# Patient Record
Sex: MALE | Race: WHITE | NOT HISPANIC OR LATINO | Employment: OTHER | URBAN - METROPOLITAN AREA
[De-identification: names, ages, dates, MRNs, and addresses within clinical notes are randomized per-mention and may not be internally consistent; named-entity substitution may affect disease eponyms.]

---

## 2017-01-12 ENCOUNTER — OFFICE VISIT (OUTPATIENT)
Dept: WOUND CARE | Facility: HOSPITAL | Age: 72
End: 2017-01-12
Attending: SURGERY
Payer: MEDICARE

## 2017-01-12 VITALS — HEART RATE: 50 BPM | RESPIRATION RATE: 20 BRPM | DIASTOLIC BLOOD PRESSURE: 82 MMHG | SYSTOLIC BLOOD PRESSURE: 132 MMHG

## 2017-01-12 DIAGNOSIS — E08.621 DIABETES MELLITUS DUE TO UNDERLYING CONDITION WITH FOOT ULCER, WITHOUT LONG-TERM CURRENT USE OF INSULIN: ICD-10-CM

## 2017-01-12 DIAGNOSIS — L97.522 SKIN ULCER OF TOE OF LEFT FOOT WITH FAT LAYER EXPOSED: Primary | ICD-10-CM

## 2017-01-12 DIAGNOSIS — L97.509 DIABETES MELLITUS DUE TO UNDERLYING CONDITION WITH FOOT ULCER, WITHOUT LONG-TERM CURRENT USE OF INSULIN: ICD-10-CM

## 2017-01-12 PROCEDURE — 11042 DBRDMT SUBQ TIS 1ST 20SQCM/<: CPT

## 2017-01-12 PROCEDURE — 27201912 HC WOUND CARE DEBRIDEMENT SUPPLIES

## 2017-01-26 ENCOUNTER — OFFICE VISIT (OUTPATIENT)
Dept: WOUND CARE | Facility: HOSPITAL | Age: 72
End: 2017-01-26
Attending: SURGERY
Payer: MEDICARE

## 2017-01-26 VITALS — DIASTOLIC BLOOD PRESSURE: 78 MMHG | RESPIRATION RATE: 20 BRPM | SYSTOLIC BLOOD PRESSURE: 130 MMHG | HEART RATE: 53 BPM

## 2017-01-26 DIAGNOSIS — L97.509 DIABETES MELLITUS DUE TO UNDERLYING CONDITION WITH FOOT ULCER, UNSPECIFIED LONG TERM INSULIN USE STATUS: Primary | ICD-10-CM

## 2017-01-26 DIAGNOSIS — E08.621 DIABETES MELLITUS DUE TO UNDERLYING CONDITION WITH FOOT ULCER, UNSPECIFIED LONG TERM INSULIN USE STATUS: Primary | ICD-10-CM

## 2017-01-26 PROCEDURE — 11042 DBRDMT SUBQ TIS 1ST 20SQCM/<: CPT

## 2017-01-26 PROCEDURE — 27201912 HC WOUND CARE DEBRIDEMENT SUPPLIES

## 2017-01-26 NOTE — MR AVS SNAPSHOT
[facility] [address] [place] [address]  Phone: [phone]  Fax: [phone]                  Kane Cool   2017 8:30 AM   Office Visit    Description:  Male : 1945   Provider:  Antonio Hidalgo Jr., MD   Department:  [facility]                To  List           Goals (5 Years of Data)     None      [facility]     [facility] Care [facility] -  Assistance  Registered nurses in the [facility] provide clinical advisement, health education, appointment booking, and other advisory services.  Call for this free service at [phone].             Medications           Message regarding Medications     Verify the changes and/or additions to your medication regime listed below are the same as discussed with your clinician today.  If any of these changes or additions are incorrect, please notify your healthcare provider.             Verify that the below list of medications is an accurate representation of the medications you are currently taking.  If none reported, the list may be blank. If incorrect, please contact your healthcare provider. Carry this list with you in case of emergency.           Current Medications     amiodarone (PACERONE) 200 MG Tab     amlodipine (NORVASC) 5 MG tablet Take 10 mg by mouth once daily.     ascorbic acid, vitamin C, (VITAMIN C) 500 MG tablet Take 500 mg by mouth once daily.    atorvastatin (LIPITOR) 20 MG tablet     doxazosin (CARDURA) 4 MG tablet 4 mg 2 (two) times daily.     glimepiride (AMARYL) 2 MG tablet     hydrochlorothiazide (HYDRODIURIL) 25 MG tablet Take 25 mg by mouth once daily.    metformin (GLUCOPHAGE) 1000 MG tablet 500 mg 2 (two) times daily with meals.     metformin (GLUCOPHAGE) 500 MG tablet Take 500 mg by mouth 3 (three) times daily.     metoprolol succinate (TOPROL-XL) 100 MG 24 hr tablet Take 50 mg by mouth once daily.     vitamin D 1000 units Tab Take 185 mg by  mouth once daily.           Clinical Reference Information           Vital Signs - Last Recorded  Most recent update: 1/26/2017  8:59 AM by Asha Padilla RN    BP Pulse Resp             130/78 (!) 53 20         Blood Pressure          Most Recent Value    BP  130/78      Allergies as of 1/26/2017     Asa [Aspirin]    Neosporin [Benzalkonium Chloride]      Immunizations Administered on Date of Encounter - 1/26/2017     None      [facility] Sign-Up     Activating your [facility] account is as easy as 1-2-3!     1) Visit [email or web address], select Sign Up Now, enter this activation code and your date of birth, then select Next.  [email or web address]  Expires: 3/12/2017  8:59 AM      2) Create a username and password to use when you visit [facility] in the future and select a security question in case you lose your password and select Next.    3) Enter your e-mail address and click Sign Up!    Additional Information  If you have questions, please e-mail [email or web address] or call [phone] to talk to our [facility] staff. Remember, [facility] is NOT to be used for urgent needs. For medical emergencies, dial 911.

## 2017-02-09 ENCOUNTER — OFFICE VISIT (OUTPATIENT)
Dept: WOUND CARE | Facility: HOSPITAL | Age: 72
End: 2017-02-09
Attending: SURGERY
Payer: MEDICARE

## 2017-02-09 VITALS — DIASTOLIC BLOOD PRESSURE: 84 MMHG | HEART RATE: 52 BPM | RESPIRATION RATE: 20 BRPM | SYSTOLIC BLOOD PRESSURE: 150 MMHG

## 2017-02-09 DIAGNOSIS — L97.522 PLANTAR ULCER OF LEFT FOOT, WITH FAT LAYER EXPOSED: ICD-10-CM

## 2017-02-09 DIAGNOSIS — L97.509 DIABETES MELLITUS DUE TO UNDERLYING CONDITION WITH FOOT ULCER, WITHOUT LONG-TERM CURRENT USE OF INSULIN: ICD-10-CM

## 2017-02-09 DIAGNOSIS — L97.509 DIABETES MELLITUS DUE TO UNDERLYING CONDITION WITH FOOT ULCER, UNSPECIFIED LONG TERM INSULIN USE STATUS: Primary | ICD-10-CM

## 2017-02-09 DIAGNOSIS — E08.621 DIABETES MELLITUS DUE TO UNDERLYING CONDITION WITH FOOT ULCER, UNSPECIFIED LONG TERM INSULIN USE STATUS: Primary | ICD-10-CM

## 2017-02-09 DIAGNOSIS — L97.522 SKIN ULCER OF TOE OF LEFT FOOT WITH FAT LAYER EXPOSED: ICD-10-CM

## 2017-02-09 DIAGNOSIS — E08.621 DIABETES MELLITUS DUE TO UNDERLYING CONDITION WITH FOOT ULCER, WITHOUT LONG-TERM CURRENT USE OF INSULIN: ICD-10-CM

## 2017-02-09 PROCEDURE — 11042 DBRDMT SUBQ TIS 1ST 20SQCM/<: CPT

## 2017-02-09 PROCEDURE — 27201912 HC WOUND CARE DEBRIDEMENT SUPPLIES

## 2017-02-09 NOTE — PROGRESS NOTES
The documentation for this encounter was completed in [facility].Please see the scanned in documents for the details.    Elieser Charles MD

## 2017-02-23 ENCOUNTER — OFFICE VISIT (OUTPATIENT)
Dept: WOUND CARE | Facility: HOSPITAL | Age: 72
End: 2017-02-23
Attending: SURGERY
Payer: MEDICARE

## 2017-02-23 VITALS — SYSTOLIC BLOOD PRESSURE: 129 MMHG | HEART RATE: 52 BPM | DIASTOLIC BLOOD PRESSURE: 77 MMHG | RESPIRATION RATE: 20 BRPM

## 2017-02-23 DIAGNOSIS — E08.621 DIABETES MELLITUS DUE TO UNDERLYING CONDITION WITH FOOT ULCER, UNSPECIFIED LONG TERM INSULIN USE STATUS: ICD-10-CM

## 2017-02-23 DIAGNOSIS — L97.522 SKIN ULCER OF TOE OF LEFT FOOT WITH FAT LAYER EXPOSED: Primary | ICD-10-CM

## 2017-02-23 DIAGNOSIS — L97.509 DIABETES MELLITUS DUE TO UNDERLYING CONDITION WITH FOOT ULCER, UNSPECIFIED LONG TERM INSULIN USE STATUS: ICD-10-CM

## 2017-02-23 PROCEDURE — 27201912 HC WOUND CARE DEBRIDEMENT SUPPLIES

## 2017-02-23 PROCEDURE — 11042 DBRDMT SUBQ TIS 1ST 20SQCM/<: CPT

## 2017-03-09 ENCOUNTER — OFFICE VISIT (OUTPATIENT)
Dept: WOUND CARE | Facility: HOSPITAL | Age: 72
End: 2017-03-09
Attending: SURGERY
Payer: MEDICARE

## 2017-03-09 VITALS — HEART RATE: 51 BPM | SYSTOLIC BLOOD PRESSURE: 141 MMHG | DIASTOLIC BLOOD PRESSURE: 77 MMHG | RESPIRATION RATE: 18 BRPM

## 2017-03-09 DIAGNOSIS — L97.509 DIABETES MELLITUS DUE TO UNDERLYING CONDITION WITH FOOT ULCER, UNSPECIFIED LONG TERM INSULIN USE STATUS: Primary | ICD-10-CM

## 2017-03-09 DIAGNOSIS — E08.621 DIABETES MELLITUS DUE TO UNDERLYING CONDITION WITH FOOT ULCER, UNSPECIFIED LONG TERM INSULIN USE STATUS: Primary | ICD-10-CM

## 2017-03-09 DIAGNOSIS — L97.522 SKIN ULCER OF TOE OF LEFT FOOT WITH FAT LAYER EXPOSED: ICD-10-CM

## 2017-03-09 PROCEDURE — 11042 DBRDMT SUBQ TIS 1ST 20SQCM/<: CPT

## 2017-03-23 ENCOUNTER — OFFICE VISIT (OUTPATIENT)
Dept: WOUND CARE | Facility: HOSPITAL | Age: 72
End: 2017-03-23
Attending: SURGERY
Payer: MEDICARE

## 2017-03-23 VITALS — HEART RATE: 71 BPM | DIASTOLIC BLOOD PRESSURE: 80 MMHG | RESPIRATION RATE: 20 BRPM | SYSTOLIC BLOOD PRESSURE: 123 MMHG

## 2017-03-23 DIAGNOSIS — E08.621 DIABETES MELLITUS DUE TO UNDERLYING CONDITION WITH FOOT ULCER, UNSPECIFIED LONG TERM INSULIN USE STATUS: Primary | ICD-10-CM

## 2017-03-23 DIAGNOSIS — L97.509 DIABETES MELLITUS DUE TO UNDERLYING CONDITION WITH FOOT ULCER, UNSPECIFIED LONG TERM INSULIN USE STATUS: Primary | ICD-10-CM

## 2017-03-23 DIAGNOSIS — L97.522 SKIN ULCER OF TOE OF LEFT FOOT WITH FAT LAYER EXPOSED: ICD-10-CM

## 2017-03-23 PROCEDURE — 11042 DBRDMT SUBQ TIS 1ST 20SQCM/<: CPT

## 2017-03-23 PROCEDURE — 27201912 HC WOUND CARE DEBRIDEMENT SUPPLIES

## 2017-04-06 ENCOUNTER — OFFICE VISIT (OUTPATIENT)
Dept: WOUND CARE | Facility: HOSPITAL | Age: 72
End: 2017-04-06
Attending: SURGERY
Payer: MEDICARE

## 2017-04-06 VITALS — RESPIRATION RATE: 20 BRPM | DIASTOLIC BLOOD PRESSURE: 76 MMHG | HEART RATE: 49 BPM | SYSTOLIC BLOOD PRESSURE: 141 MMHG

## 2017-04-06 DIAGNOSIS — E08.621 DIABETES MELLITUS DUE TO UNDERLYING CONDITION WITH FOOT ULCER, UNSPECIFIED LONG TERM INSULIN USE STATUS: Primary | ICD-10-CM

## 2017-04-06 DIAGNOSIS — L97.509 DIABETES MELLITUS DUE TO UNDERLYING CONDITION WITH FOOT ULCER, UNSPECIFIED LONG TERM INSULIN USE STATUS: Primary | ICD-10-CM

## 2017-04-06 DIAGNOSIS — L97.522 SKIN ULCER OF TOE OF LEFT FOOT WITH FAT LAYER EXPOSED: ICD-10-CM

## 2017-04-06 PROCEDURE — 11042 DBRDMT SUBQ TIS 1ST 20SQCM/<: CPT

## 2017-04-06 PROCEDURE — 27201912 HC WOUND CARE DEBRIDEMENT SUPPLIES

## 2017-04-20 ENCOUNTER — OFFICE VISIT (OUTPATIENT)
Dept: WOUND CARE | Facility: HOSPITAL | Age: 72
End: 2017-04-20
Attending: SURGERY
Payer: MEDICARE

## 2017-04-20 VITALS — HEART RATE: 61 BPM | SYSTOLIC BLOOD PRESSURE: 138 MMHG | DIASTOLIC BLOOD PRESSURE: 84 MMHG | RESPIRATION RATE: 20 BRPM

## 2017-04-20 DIAGNOSIS — E08.621 DIABETES MELLITUS DUE TO UNDERLYING CONDITION WITH FOOT ULCER, UNSPECIFIED LONG TERM INSULIN USE STATUS: Primary | ICD-10-CM

## 2017-04-20 DIAGNOSIS — L97.509 DIABETES MELLITUS DUE TO UNDERLYING CONDITION WITH FOOT ULCER, UNSPECIFIED LONG TERM INSULIN USE STATUS: Primary | ICD-10-CM

## 2017-04-20 DIAGNOSIS — L97.522 SKIN ULCER OF TOE OF LEFT FOOT WITH FAT LAYER EXPOSED: ICD-10-CM

## 2017-04-20 PROCEDURE — 11042 DBRDMT SUBQ TIS 1ST 20SQCM/<: CPT

## 2017-04-20 PROCEDURE — 27201912 HC WOUND CARE DEBRIDEMENT SUPPLIES

## 2017-05-04 ENCOUNTER — OFFICE VISIT (OUTPATIENT)
Dept: WOUND CARE | Facility: HOSPITAL | Age: 72
End: 2017-05-04
Attending: SURGERY
Payer: MEDICARE

## 2017-05-04 VITALS — HEART RATE: 50 BPM | DIASTOLIC BLOOD PRESSURE: 76 MMHG | SYSTOLIC BLOOD PRESSURE: 131 MMHG | RESPIRATION RATE: 20 BRPM

## 2017-05-04 DIAGNOSIS — E11.42 TYPE 2 DIABETES MELLITUS WITH PERIPHERAL NEUROPATHY: ICD-10-CM

## 2017-05-04 DIAGNOSIS — L97.522 PLANTAR ULCER OF LEFT FOOT, WITH FAT LAYER EXPOSED: Primary | ICD-10-CM

## 2017-05-04 PROCEDURE — 11042 DBRDMT SUBQ TIS 1ST 20SQCM/<: CPT

## 2017-05-04 PROCEDURE — 27201912 HC WOUND CARE DEBRIDEMENT SUPPLIES

## 2017-05-18 ENCOUNTER — OFFICE VISIT (OUTPATIENT)
Dept: WOUND CARE | Facility: HOSPITAL | Age: 72
End: 2017-05-18
Attending: SURGERY
Payer: MEDICARE

## 2017-05-18 VITALS
RESPIRATION RATE: 18 BRPM | TEMPERATURE: 98 F | HEART RATE: 57 BPM | DIASTOLIC BLOOD PRESSURE: 81 MMHG | SYSTOLIC BLOOD PRESSURE: 127 MMHG

## 2017-05-18 DIAGNOSIS — E11.42 TYPE 2 DIABETES MELLITUS WITH PERIPHERAL NEUROPATHY: ICD-10-CM

## 2017-05-18 DIAGNOSIS — L97.522 SKIN ULCER OF TOE OF LEFT FOOT WITH FAT LAYER EXPOSED: ICD-10-CM

## 2017-05-18 DIAGNOSIS — L97.522 PLANTAR ULCER OF LEFT FOOT, WITH FAT LAYER EXPOSED: Primary | ICD-10-CM

## 2017-05-18 DIAGNOSIS — E08.621 DIABETES MELLITUS DUE TO UNDERLYING CONDITION WITH FOOT ULCER: ICD-10-CM

## 2017-05-18 DIAGNOSIS — L97.509 DIABETES MELLITUS DUE TO UNDERLYING CONDITION WITH FOOT ULCER: ICD-10-CM

## 2017-05-18 PROCEDURE — 11042 DBRDMT SUBQ TIS 1ST 20SQCM/<: CPT

## 2017-05-18 PROCEDURE — 27201912 HC WOUND CARE DEBRIDEMENT SUPPLIES

## 2017-06-01 ENCOUNTER — OFFICE VISIT (OUTPATIENT)
Dept: WOUND CARE | Facility: HOSPITAL | Age: 72
End: 2017-06-01
Attending: SURGERY
Payer: MEDICARE

## 2017-06-01 VITALS
RESPIRATION RATE: 20 BRPM | SYSTOLIC BLOOD PRESSURE: 140 MMHG | HEART RATE: 52 BPM | DIASTOLIC BLOOD PRESSURE: 78 MMHG | TEMPERATURE: 98 F

## 2017-06-01 DIAGNOSIS — E11.42 TYPE 2 DIABETES MELLITUS WITH PERIPHERAL NEUROPATHY: ICD-10-CM

## 2017-06-01 DIAGNOSIS — E08.621 DIABETES MELLITUS DUE TO UNDERLYING CONDITION WITH FOOT ULCER: ICD-10-CM

## 2017-06-01 DIAGNOSIS — L97.522 PLANTAR ULCER OF LEFT FOOT, WITH FAT LAYER EXPOSED: Primary | ICD-10-CM

## 2017-06-01 DIAGNOSIS — L97.522 SKIN ULCER OF TOE OF LEFT FOOT WITH FAT LAYER EXPOSED: ICD-10-CM

## 2017-06-01 DIAGNOSIS — L97.509 DIABETES MELLITUS DUE TO UNDERLYING CONDITION WITH FOOT ULCER: ICD-10-CM

## 2017-06-01 PROCEDURE — 11042 DBRDMT SUBQ TIS 1ST 20SQCM/<: CPT

## 2017-06-01 PROCEDURE — 27201912 HC WOUND CARE DEBRIDEMENT SUPPLIES

## 2017-06-15 ENCOUNTER — OFFICE VISIT (OUTPATIENT)
Dept: WOUND CARE | Facility: HOSPITAL | Age: 72
End: 2017-06-15
Attending: SURGERY
Payer: MEDICARE

## 2017-06-15 VITALS
DIASTOLIC BLOOD PRESSURE: 77 MMHG | TEMPERATURE: 98 F | RESPIRATION RATE: 20 BRPM | HEART RATE: 61 BPM | SYSTOLIC BLOOD PRESSURE: 134 MMHG

## 2017-06-15 DIAGNOSIS — L97.509 DIABETES MELLITUS DUE TO UNDERLYING CONDITION WITH FOOT ULCER, UNSPECIFIED LONG TERM INSULIN USE STATUS: Primary | ICD-10-CM

## 2017-06-15 DIAGNOSIS — L97.522 SKIN ULCER OF TOE OF LEFT FOOT WITH FAT LAYER EXPOSED: ICD-10-CM

## 2017-06-15 DIAGNOSIS — E08.621 DIABETES MELLITUS DUE TO UNDERLYING CONDITION WITH FOOT ULCER, UNSPECIFIED LONG TERM INSULIN USE STATUS: Primary | ICD-10-CM

## 2017-06-15 PROCEDURE — 27201912 HC WOUND CARE DEBRIDEMENT SUPPLIES

## 2017-06-15 PROCEDURE — 11042 DBRDMT SUBQ TIS 1ST 20SQCM/<: CPT

## 2017-06-29 ENCOUNTER — OFFICE VISIT (OUTPATIENT)
Dept: WOUND CARE | Facility: HOSPITAL | Age: 72
End: 2017-06-29
Attending: SURGERY
Payer: MEDICARE

## 2017-06-29 VITALS
SYSTOLIC BLOOD PRESSURE: 137 MMHG | TEMPERATURE: 98 F | HEART RATE: 55 BPM | RESPIRATION RATE: 20 BRPM | DIASTOLIC BLOOD PRESSURE: 83 MMHG

## 2017-06-29 DIAGNOSIS — L97.509 DIABETES MELLITUS DUE TO UNDERLYING CONDITION WITH FOOT ULCER: ICD-10-CM

## 2017-06-29 DIAGNOSIS — E11.42 TYPE 2 DIABETES MELLITUS WITH PERIPHERAL NEUROPATHY: Primary | ICD-10-CM

## 2017-06-29 DIAGNOSIS — E08.621 DIABETES MELLITUS DUE TO UNDERLYING CONDITION WITH FOOT ULCER: ICD-10-CM

## 2017-06-29 DIAGNOSIS — L97.522 ULCER OF LEFT FOOT, WITH FAT LAYER EXPOSED: ICD-10-CM

## 2017-06-29 PROCEDURE — 11042 DBRDMT SUBQ TIS 1ST 20SQCM/<: CPT

## 2017-06-29 PROCEDURE — 27201912 HC WOUND CARE DEBRIDEMENT SUPPLIES

## 2017-07-06 ENCOUNTER — OFFICE VISIT (OUTPATIENT)
Dept: WOUND CARE | Facility: HOSPITAL | Age: 72
End: 2017-07-06
Attending: SURGERY
Payer: MEDICARE

## 2017-07-06 VITALS — DIASTOLIC BLOOD PRESSURE: 72 MMHG | RESPIRATION RATE: 20 BRPM | HEART RATE: 61 BPM | SYSTOLIC BLOOD PRESSURE: 121 MMHG

## 2017-07-06 DIAGNOSIS — L97.522 SKIN ULCER OF LEFT FOOT WITH FAT LAYER EXPOSED: ICD-10-CM

## 2017-07-06 DIAGNOSIS — L97.509 DIABETES MELLITUS DUE TO UNDERLYING CONDITION WITH FOOT ULCER, UNSPECIFIED LONG TERM INSULIN USE STATUS: Primary | ICD-10-CM

## 2017-07-06 DIAGNOSIS — E08.621 DIABETES MELLITUS DUE TO UNDERLYING CONDITION WITH FOOT ULCER, UNSPECIFIED LONG TERM INSULIN USE STATUS: Primary | ICD-10-CM

## 2017-07-06 PROCEDURE — 11042 DBRDMT SUBQ TIS 1ST 20SQCM/<: CPT

## 2017-07-06 PROCEDURE — 27201912 HC WOUND CARE DEBRIDEMENT SUPPLIES

## 2017-07-13 ENCOUNTER — OFFICE VISIT (OUTPATIENT)
Dept: WOUND CARE | Facility: HOSPITAL | Age: 72
End: 2017-07-13
Attending: SURGERY
Payer: MEDICARE

## 2017-07-13 VITALS
HEART RATE: 54 BPM | DIASTOLIC BLOOD PRESSURE: 73 MMHG | TEMPERATURE: 97 F | RESPIRATION RATE: 20 BRPM | SYSTOLIC BLOOD PRESSURE: 131 MMHG

## 2017-07-13 DIAGNOSIS — L97.522 SKIN ULCER OF LEFT FOOT WITH FAT LAYER EXPOSED: Primary | ICD-10-CM

## 2017-07-13 DIAGNOSIS — L97.509 DIABETES MELLITUS DUE TO UNDERLYING CONDITION WITH FOOT ULCER, WITHOUT LONG-TERM CURRENT USE OF INSULIN: ICD-10-CM

## 2017-07-13 DIAGNOSIS — L97.522 PLANTAR ULCER OF LEFT FOOT, WITH FAT LAYER EXPOSED: ICD-10-CM

## 2017-07-13 DIAGNOSIS — L97.522 SKIN ULCER OF TOE OF LEFT FOOT WITH FAT LAYER EXPOSED: ICD-10-CM

## 2017-07-13 DIAGNOSIS — E08.621 DIABETES MELLITUS DUE TO UNDERLYING CONDITION WITH FOOT ULCER, WITHOUT LONG-TERM CURRENT USE OF INSULIN: ICD-10-CM

## 2017-07-13 PROCEDURE — 27201912 HC WOUND CARE DEBRIDEMENT SUPPLIES

## 2017-07-13 PROCEDURE — 11042 DBRDMT SUBQ TIS 1ST 20SQCM/<: CPT

## 2017-07-20 ENCOUNTER — OFFICE VISIT (OUTPATIENT)
Dept: WOUND CARE | Facility: HOSPITAL | Age: 72
End: 2017-07-20
Attending: SURGERY
Payer: MEDICARE

## 2017-07-20 VITALS — RESPIRATION RATE: 20 BRPM | SYSTOLIC BLOOD PRESSURE: 110 MMHG | HEART RATE: 59 BPM | DIASTOLIC BLOOD PRESSURE: 75 MMHG

## 2017-07-20 DIAGNOSIS — E08.621 DIABETES MELLITUS DUE TO UNDERLYING CONDITION WITH FOOT ULCER, UNSPECIFIED LONG TERM INSULIN USE STATUS: Primary | ICD-10-CM

## 2017-07-20 DIAGNOSIS — L97.522 SKIN ULCER OF LEFT FOOT WITH FAT LAYER EXPOSED: ICD-10-CM

## 2017-07-20 DIAGNOSIS — L97.509 DIABETES MELLITUS DUE TO UNDERLYING CONDITION WITH FOOT ULCER, UNSPECIFIED LONG TERM INSULIN USE STATUS: Primary | ICD-10-CM

## 2017-07-20 PROCEDURE — 27201912 HC WOUND CARE DEBRIDEMENT SUPPLIES

## 2017-07-20 PROCEDURE — 11042 DBRDMT SUBQ TIS 1ST 20SQCM/<: CPT

## 2017-07-27 ENCOUNTER — OFFICE VISIT (OUTPATIENT)
Dept: WOUND CARE | Facility: HOSPITAL | Age: 72
End: 2017-07-27
Attending: SURGERY
Payer: MEDICARE

## 2017-07-27 VITALS
DIASTOLIC BLOOD PRESSURE: 77 MMHG | SYSTOLIC BLOOD PRESSURE: 140 MMHG | TEMPERATURE: 98 F | RESPIRATION RATE: 20 BRPM | HEART RATE: 55 BPM

## 2017-07-27 DIAGNOSIS — L97.522 PLANTAR ULCER OF LEFT FOOT, WITH FAT LAYER EXPOSED: ICD-10-CM

## 2017-07-27 DIAGNOSIS — E08.621 DIABETES MELLITUS DUE TO UNDERLYING CONDITION WITH FOOT ULCER, UNSPECIFIED LONG TERM INSULIN USE STATUS: Primary | ICD-10-CM

## 2017-07-27 DIAGNOSIS — L97.509 DIABETES MELLITUS DUE TO UNDERLYING CONDITION WITH FOOT ULCER, UNSPECIFIED LONG TERM INSULIN USE STATUS: Primary | ICD-10-CM

## 2017-07-27 PROCEDURE — 27201912 HC WOUND CARE DEBRIDEMENT SUPPLIES

## 2017-07-27 PROCEDURE — 11042 DBRDMT SUBQ TIS 1ST 20SQCM/<: CPT

## 2017-08-03 ENCOUNTER — OFFICE VISIT (OUTPATIENT)
Dept: WOUND CARE | Facility: HOSPITAL | Age: 72
End: 2017-08-03
Attending: SURGERY
Payer: MEDICARE

## 2017-08-03 VITALS
SYSTOLIC BLOOD PRESSURE: 131 MMHG | DIASTOLIC BLOOD PRESSURE: 79 MMHG | TEMPERATURE: 98 F | RESPIRATION RATE: 20 BRPM | HEART RATE: 64 BPM

## 2017-08-03 DIAGNOSIS — L97.509 DIABETES MELLITUS DUE TO UNDERLYING CONDITION WITH FOOT ULCER, UNSPECIFIED LONG TERM INSULIN USE STATUS: ICD-10-CM

## 2017-08-03 DIAGNOSIS — E08.621 DIABETES MELLITUS DUE TO UNDERLYING CONDITION WITH FOOT ULCER, UNSPECIFIED LONG TERM INSULIN USE STATUS: ICD-10-CM

## 2017-08-03 DIAGNOSIS — L97.522 PLANTAR ULCER OF LEFT FOOT, WITH FAT LAYER EXPOSED: Primary | ICD-10-CM

## 2017-08-03 PROCEDURE — 27201912 HC WOUND CARE DEBRIDEMENT SUPPLIES

## 2017-08-03 PROCEDURE — 11042 DBRDMT SUBQ TIS 1ST 20SQCM/<: CPT

## 2017-08-03 NOTE — PROGRESS NOTES
ROS No new c/o.   Description:  Location: left foot metatarsal head   Duration: Jan. 2016   Context: Had cellulitis and in hospital Jan 2016   Modifying Factors: Diabetes   Pt does lots of gardening and landscaping and doesn't stay off feet enough.

## 2017-08-10 ENCOUNTER — OFFICE VISIT (OUTPATIENT)
Dept: WOUND CARE | Facility: HOSPITAL | Age: 72
End: 2017-08-10
Attending: SURGERY
Payer: MEDICARE

## 2017-08-10 VITALS
SYSTOLIC BLOOD PRESSURE: 101 MMHG | DIASTOLIC BLOOD PRESSURE: 68 MMHG | HEART RATE: 58 BPM | TEMPERATURE: 98 F | RESPIRATION RATE: 20 BRPM

## 2017-08-10 DIAGNOSIS — E08.621 DIABETES MELLITUS DUE TO UNDERLYING CONDITION WITH FOOT ULCER, UNSPECIFIED LONG TERM INSULIN USE STATUS: Primary | ICD-10-CM

## 2017-08-10 DIAGNOSIS — L97.509 DIABETES MELLITUS DUE TO UNDERLYING CONDITION WITH FOOT ULCER, UNSPECIFIED LONG TERM INSULIN USE STATUS: Primary | ICD-10-CM

## 2017-08-10 DIAGNOSIS — L97.522 SKIN ULCER OF LEFT FOOT WITH FAT LAYER EXPOSED: ICD-10-CM

## 2017-08-10 PROCEDURE — 27201912 HC WOUND CARE DEBRIDEMENT SUPPLIES

## 2017-08-10 PROCEDURE — 11042 DBRDMT SUBQ TIS 1ST 20SQCM/<: CPT

## 2017-08-17 ENCOUNTER — OFFICE VISIT (OUTPATIENT)
Dept: WOUND CARE | Facility: HOSPITAL | Age: 72
End: 2017-08-17
Attending: SURGERY
Payer: MEDICARE

## 2017-08-17 VITALS — RESPIRATION RATE: 20 BRPM | HEART RATE: 61 BPM | SYSTOLIC BLOOD PRESSURE: 116 MMHG | DIASTOLIC BLOOD PRESSURE: 73 MMHG

## 2017-08-17 DIAGNOSIS — L97.522 PLANTAR ULCER OF LEFT FOOT, WITH FAT LAYER EXPOSED: Primary | ICD-10-CM

## 2017-08-17 DIAGNOSIS — E11.42 TYPE 2 DIABETES MELLITUS WITH PERIPHERAL NEUROPATHY: ICD-10-CM

## 2017-08-17 PROCEDURE — 11042 DBRDMT SUBQ TIS 1ST 20SQCM/<: CPT

## 2017-08-17 PROCEDURE — 27201912 HC WOUND CARE DEBRIDEMENT SUPPLIES

## 2017-08-24 ENCOUNTER — OFFICE VISIT (OUTPATIENT)
Dept: WOUND CARE | Facility: HOSPITAL | Age: 72
End: 2017-08-24
Attending: SURGERY
Payer: MEDICARE

## 2017-08-24 VITALS — HEART RATE: 71 BPM | RESPIRATION RATE: 18 BRPM | SYSTOLIC BLOOD PRESSURE: 106 MMHG | DIASTOLIC BLOOD PRESSURE: 70 MMHG

## 2017-08-24 DIAGNOSIS — L97.509 DIABETES MELLITUS DUE TO UNDERLYING CONDITION WITH FOOT ULCER, UNSPECIFIED LONG TERM INSULIN USE STATUS: Primary | ICD-10-CM

## 2017-08-24 DIAGNOSIS — E08.621 DIABETES MELLITUS DUE TO UNDERLYING CONDITION WITH FOOT ULCER, UNSPECIFIED LONG TERM INSULIN USE STATUS: Primary | ICD-10-CM

## 2017-08-24 DIAGNOSIS — L97.522 SKIN ULCER OF LEFT FOOT WITH FAT LAYER EXPOSED: ICD-10-CM

## 2017-08-24 PROCEDURE — 11042 DBRDMT SUBQ TIS 1ST 20SQCM/<: CPT

## 2017-08-24 PROCEDURE — 27201912 HC WOUND CARE DEBRIDEMENT SUPPLIES

## 2017-08-31 ENCOUNTER — OFFICE VISIT (OUTPATIENT)
Dept: WOUND CARE | Facility: HOSPITAL | Age: 72
End: 2017-08-31
Attending: SURGERY
Payer: MEDICARE

## 2017-08-31 VITALS
SYSTOLIC BLOOD PRESSURE: 109 MMHG | RESPIRATION RATE: 20 BRPM | HEART RATE: 57 BPM | TEMPERATURE: 98 F | DIASTOLIC BLOOD PRESSURE: 68 MMHG

## 2017-08-31 DIAGNOSIS — L97.522 PLANTAR ULCER OF LEFT FOOT, WITH FAT LAYER EXPOSED: ICD-10-CM

## 2017-08-31 DIAGNOSIS — L97.522 SKIN ULCER OF LEFT FOOT WITH FAT LAYER EXPOSED: Primary | ICD-10-CM

## 2017-08-31 DIAGNOSIS — L97.522 SKIN ULCER OF TOE OF LEFT FOOT WITH FAT LAYER EXPOSED: ICD-10-CM

## 2017-08-31 PROCEDURE — 27201912 HC WOUND CARE DEBRIDEMENT SUPPLIES

## 2017-08-31 PROCEDURE — 11042 DBRDMT SUBQ TIS 1ST 20SQCM/<: CPT

## 2017-09-07 ENCOUNTER — OFFICE VISIT (OUTPATIENT)
Dept: WOUND CARE | Facility: HOSPITAL | Age: 72
End: 2017-09-07
Attending: SURGERY
Payer: MEDICARE

## 2017-09-07 VITALS — SYSTOLIC BLOOD PRESSURE: 136 MMHG | DIASTOLIC BLOOD PRESSURE: 79 MMHG | HEART RATE: 56 BPM | RESPIRATION RATE: 20 BRPM

## 2017-09-07 DIAGNOSIS — E08.621 DIABETES MELLITUS DUE TO UNDERLYING CONDITION WITH FOOT ULCER, UNSPECIFIED LONG TERM INSULIN USE STATUS: Primary | ICD-10-CM

## 2017-09-07 DIAGNOSIS — L97.509 DIABETES MELLITUS DUE TO UNDERLYING CONDITION WITH FOOT ULCER, UNSPECIFIED LONG TERM INSULIN USE STATUS: Primary | ICD-10-CM

## 2017-09-07 PROCEDURE — 11042 DBRDMT SUBQ TIS 1ST 20SQCM/<: CPT

## 2017-09-07 PROCEDURE — 27201912 HC WOUND CARE DEBRIDEMENT SUPPLIES

## 2017-09-14 ENCOUNTER — OFFICE VISIT (OUTPATIENT)
Dept: WOUND CARE | Facility: HOSPITAL | Age: 72
End: 2017-09-14
Attending: SURGERY
Payer: MEDICARE

## 2017-09-14 VITALS
TEMPERATURE: 98 F | HEART RATE: 82 BPM | SYSTOLIC BLOOD PRESSURE: 132 MMHG | DIASTOLIC BLOOD PRESSURE: 86 MMHG | RESPIRATION RATE: 20 BRPM

## 2017-09-14 DIAGNOSIS — E08.621 DIABETES MELLITUS DUE TO UNDERLYING CONDITION WITH FOOT ULCER, UNSPECIFIED LONG TERM INSULIN USE STATUS: Primary | ICD-10-CM

## 2017-09-14 DIAGNOSIS — L97.522 PLANTAR ULCER OF LEFT FOOT, WITH FAT LAYER EXPOSED: ICD-10-CM

## 2017-09-14 DIAGNOSIS — L97.522 SKIN ULCER OF LEFT FOOT WITH FAT LAYER EXPOSED: ICD-10-CM

## 2017-09-14 DIAGNOSIS — L97.509 DIABETES MELLITUS DUE TO UNDERLYING CONDITION WITH FOOT ULCER, UNSPECIFIED LONG TERM INSULIN USE STATUS: Primary | ICD-10-CM

## 2017-09-14 DIAGNOSIS — L97.522 SKIN ULCER OF TOE OF LEFT FOOT WITH FAT LAYER EXPOSED: ICD-10-CM

## 2017-09-14 PROCEDURE — 11042 DBRDMT SUBQ TIS 1ST 20SQCM/<: CPT

## 2017-09-14 PROCEDURE — 27201912 HC WOUND CARE DEBRIDEMENT SUPPLIES

## 2017-09-21 ENCOUNTER — OFFICE VISIT (OUTPATIENT)
Dept: WOUND CARE | Facility: HOSPITAL | Age: 72
End: 2017-09-21
Attending: SURGERY
Payer: MEDICARE

## 2017-09-21 VITALS
RESPIRATION RATE: 20 BRPM | HEART RATE: 53 BPM | SYSTOLIC BLOOD PRESSURE: 126 MMHG | DIASTOLIC BLOOD PRESSURE: 78 MMHG | TEMPERATURE: 97 F

## 2017-09-21 DIAGNOSIS — L97.522 SKIN ULCER OF LEFT FOOT WITH FAT LAYER EXPOSED: ICD-10-CM

## 2017-09-21 DIAGNOSIS — E08.621 DIABETES MELLITUS DUE TO UNDERLYING CONDITION WITH FOOT ULCER, UNSPECIFIED LONG TERM INSULIN USE STATUS: Primary | ICD-10-CM

## 2017-09-21 DIAGNOSIS — L97.509 DIABETES MELLITUS DUE TO UNDERLYING CONDITION WITH FOOT ULCER, UNSPECIFIED LONG TERM INSULIN USE STATUS: Primary | ICD-10-CM

## 2017-09-21 PROCEDURE — 27201912 HC WOUND CARE DEBRIDEMENT SUPPLIES

## 2017-09-21 PROCEDURE — 11042 DBRDMT SUBQ TIS 1ST 20SQCM/<: CPT

## 2017-09-28 ENCOUNTER — OFFICE VISIT (OUTPATIENT)
Dept: WOUND CARE | Facility: HOSPITAL | Age: 72
End: 2017-09-28
Attending: SURGERY
Payer: MEDICARE

## 2017-09-28 VITALS — SYSTOLIC BLOOD PRESSURE: 116 MMHG | HEART RATE: 66 BPM | RESPIRATION RATE: 20 BRPM | DIASTOLIC BLOOD PRESSURE: 73 MMHG

## 2017-09-28 DIAGNOSIS — L97.522 PLANTAR ULCER OF LEFT FOOT, WITH FAT LAYER EXPOSED: ICD-10-CM

## 2017-09-28 DIAGNOSIS — L97.522 SKIN ULCER OF TOE OF LEFT FOOT WITH FAT LAYER EXPOSED: Primary | ICD-10-CM

## 2017-09-28 DIAGNOSIS — E11.42 TYPE 2 DIABETES MELLITUS WITH PERIPHERAL NEUROPATHY: ICD-10-CM

## 2017-09-28 PROCEDURE — 11042 DBRDMT SUBQ TIS 1ST 20SQCM/<: CPT

## 2017-09-28 PROCEDURE — 27201912 HC WOUND CARE DEBRIDEMENT SUPPLIES

## 2017-10-05 ENCOUNTER — OFFICE VISIT (OUTPATIENT)
Dept: WOUND CARE | Facility: HOSPITAL | Age: 72
End: 2017-10-05
Attending: SURGERY
Payer: MEDICARE

## 2017-10-05 VITALS
TEMPERATURE: 98 F | HEART RATE: 55 BPM | DIASTOLIC BLOOD PRESSURE: 78 MMHG | SYSTOLIC BLOOD PRESSURE: 116 MMHG | RESPIRATION RATE: 18 BRPM

## 2017-10-05 DIAGNOSIS — E11.42 TYPE 2 DIABETES MELLITUS WITH PERIPHERAL NEUROPATHY: ICD-10-CM

## 2017-10-05 DIAGNOSIS — L97.509 DIABETES MELLITUS DUE TO UNDERLYING CONDITION WITH FOOT ULCER, UNSPECIFIED LONG TERM INSULIN USE STATUS: ICD-10-CM

## 2017-10-05 DIAGNOSIS — L97.522 SKIN ULCER OF TOE OF LEFT FOOT WITH FAT LAYER EXPOSED: Primary | ICD-10-CM

## 2017-10-05 DIAGNOSIS — E08.621 DIABETES MELLITUS DUE TO UNDERLYING CONDITION WITH FOOT ULCER, UNSPECIFIED LONG TERM INSULIN USE STATUS: ICD-10-CM

## 2017-10-05 DIAGNOSIS — L97.522 PLANTAR ULCER OF LEFT FOOT, WITH FAT LAYER EXPOSED: ICD-10-CM

## 2017-10-05 PROCEDURE — 27201912 HC WOUND CARE DEBRIDEMENT SUPPLIES

## 2017-10-05 PROCEDURE — 11042 DBRDMT SUBQ TIS 1ST 20SQCM/<: CPT

## 2017-10-05 NOTE — PROGRESS NOTES
[facility]  Wound Care  Progress Note    Problem List Items Addressed This Visit     Diabetes mellitus due to underlying condition with foot ulcer    Overview     History of Present Illness      Description:  Description:  Location: left foot metatarsal head   Duration: Jan. 2016   Context: Had cellulitis and in hospital Jan 2016   Modifying Factors: Diabetes   Pt does lots of gardening and landscaping and doesn't stay off feet enough.   Wound left fifth toe healed. Ulcer medial large toe almost healed.          Current Assessment & Plan     Continue with present care.           Other Visit Diagnoses     Skin ulcer of toe of left foot with fat layer exposed    -  Primary    Plantar ulcer of left foot, with fat layer exposed        Type 2 diabetes mellitus with peripheral neuropathy              See wound doc progress notes. Documents will be scanned.        Gonzalez Alvarado  [facility]

## 2017-10-12 ENCOUNTER — OFFICE VISIT (OUTPATIENT)
Dept: WOUND CARE | Facility: HOSPITAL | Age: 72
End: 2017-10-12
Attending: SURGERY
Payer: MEDICARE

## 2017-10-12 VITALS — SYSTOLIC BLOOD PRESSURE: 141 MMHG | HEART RATE: 56 BPM | RESPIRATION RATE: 20 BRPM | DIASTOLIC BLOOD PRESSURE: 89 MMHG

## 2017-10-12 DIAGNOSIS — L97.509 DIABETES MELLITUS DUE TO UNDERLYING CONDITION WITH FOOT ULCER, UNSPECIFIED LONG TERM INSULIN USE STATUS: Primary | ICD-10-CM

## 2017-10-12 DIAGNOSIS — E08.621 DIABETES MELLITUS DUE TO UNDERLYING CONDITION WITH FOOT ULCER, UNSPECIFIED LONG TERM INSULIN USE STATUS: Primary | ICD-10-CM

## 2017-10-12 DIAGNOSIS — L97.522 SKIN ULCER OF LEFT FOOT WITH FAT LAYER EXPOSED: ICD-10-CM

## 2017-10-12 PROCEDURE — 11042 DBRDMT SUBQ TIS 1ST 20SQCM/<: CPT

## 2017-10-12 PROCEDURE — 27201912 HC WOUND CARE DEBRIDEMENT SUPPLIES

## 2017-10-12 NOTE — PROGRESS NOTES
[facility]  Wound Care  Progress Note    Problem List Items Addressed This Visit     Diabetes mellitus due to underlying condition with foot ulcer - Primary    Overview     History of Present Illness      Description:  Description:  Location: left foot metatarsal head   Duration: Jan. 2016   Context: Had cellulitis and in hospital Jan 2016   Modifying Factors: Diabetes   Pt does lots of gardening and landscaping and doesn't stay off feet enough.   Wound left fifth toe healed. Ulcer medial large toe almost healed.          Skin ulcer of left foot with fat layer exposed      Other Visit Diagnoses    None.         See wound doc progress notes. Documents will be scanned.        Antonio Hidalgo Jr  [facility]

## 2017-10-19 ENCOUNTER — OFFICE VISIT (OUTPATIENT)
Dept: WOUND CARE | Facility: HOSPITAL | Age: 72
End: 2017-10-19
Attending: SURGERY
Payer: MEDICARE

## 2017-10-19 VITALS — HEART RATE: 52 BPM | DIASTOLIC BLOOD PRESSURE: 79 MMHG | RESPIRATION RATE: 20 BRPM | SYSTOLIC BLOOD PRESSURE: 130 MMHG

## 2017-10-19 DIAGNOSIS — L97.509 DIABETES MELLITUS DUE TO UNDERLYING CONDITION WITH FOOT ULCER, UNSPECIFIED LONG TERM INSULIN USE STATUS: ICD-10-CM

## 2017-10-19 DIAGNOSIS — E08.621 DIABETES MELLITUS DUE TO UNDERLYING CONDITION WITH FOOT ULCER, UNSPECIFIED LONG TERM INSULIN USE STATUS: ICD-10-CM

## 2017-10-19 PROCEDURE — 27201912 HC WOUND CARE DEBRIDEMENT SUPPLIES

## 2017-10-19 PROCEDURE — 11042 DBRDMT SUBQ TIS 1ST 20SQCM/<: CPT

## 2017-10-19 NOTE — PROGRESS NOTES
[facility]  Wound Care  Progress Note    Problem List Items Addressed This Visit     Diabetes mellitus due to underlying condition with foot ulcer    Overview     History of Present Illness      Description:  Description:  Location: left foot metatarsal head   Duration: Jan. 2016   Context: Had cellulitis and in hospital Jan 2016   Modifying Factors: Diabetes   Pt does lots of gardening and landscaping and doesn't stay off feet enough.   Wound left fifth toe healed. Ulcer medial large toe almost healed.          Current Assessment & Plan     Documentation done on [facility]. See scanned wound care.           Other Visit Diagnoses    None.         See wound doc progress notes. Documents will be scanned.        Gonzalez Alvarado  [facility]

## 2017-11-02 ENCOUNTER — OFFICE VISIT (OUTPATIENT)
Dept: WOUND CARE | Facility: HOSPITAL | Age: 72
End: 2017-11-02
Attending: SURGERY
Payer: MEDICARE

## 2017-11-02 VITALS
TEMPERATURE: 98 F | RESPIRATION RATE: 20 BRPM | HEART RATE: 55 BPM | SYSTOLIC BLOOD PRESSURE: 116 MMHG | DIASTOLIC BLOOD PRESSURE: 66 MMHG

## 2017-11-02 DIAGNOSIS — L97.509 DIABETES MELLITUS DUE TO UNDERLYING CONDITION WITH FOOT ULCER, UNSPECIFIED LONG TERM INSULIN USE STATUS: Primary | ICD-10-CM

## 2017-11-02 DIAGNOSIS — E08.621 DIABETES MELLITUS DUE TO UNDERLYING CONDITION WITH FOOT ULCER, UNSPECIFIED LONG TERM INSULIN USE STATUS: Primary | ICD-10-CM

## 2017-11-02 PROCEDURE — 27201912 HC WOUND CARE DEBRIDEMENT SUPPLIES

## 2017-11-02 PROCEDURE — 11042 DBRDMT SUBQ TIS 1ST 20SQCM/<: CPT

## 2017-11-02 NOTE — PROGRESS NOTES
[facility]  Wound Care  Progress Note    Problem List Items Addressed This Visit     Diabetes mellitus due to underlying condition with foot ulcer - Primary    Overview     History of Present Illness      Description:  Description:  Location: left foot metatarsal head   Duration: Jan. 2016   Context: Had cellulitis and in hospital Jan 2016   Modifying Factors: Diabetes   Pt does lots of gardening and landscaping and doesn't stay off feet enough.   Wound left fifth toe healed. Ulcer medial large toe almost healed.                See wound doc progress notes. Documents will be scanned.        Antonio Hidalgo Jr  [facility]

## 2017-11-16 ENCOUNTER — OFFICE VISIT (OUTPATIENT)
Dept: WOUND CARE | Facility: HOSPITAL | Age: 72
End: 2017-11-16
Attending: SURGERY
Payer: MEDICARE

## 2017-11-16 VITALS — HEART RATE: 61 BPM | DIASTOLIC BLOOD PRESSURE: 82 MMHG | RESPIRATION RATE: 20 BRPM | SYSTOLIC BLOOD PRESSURE: 154 MMHG

## 2017-11-16 DIAGNOSIS — L97.509 DIABETES MELLITUS DUE TO UNDERLYING CONDITION WITH FOOT ULCER, UNSPECIFIED LONG TERM INSULIN USE STATUS: Primary | ICD-10-CM

## 2017-11-16 DIAGNOSIS — E08.621 DIABETES MELLITUS DUE TO UNDERLYING CONDITION WITH FOOT ULCER, UNSPECIFIED LONG TERM INSULIN USE STATUS: Primary | ICD-10-CM

## 2017-11-16 PROCEDURE — 11042 DBRDMT SUBQ TIS 1ST 20SQCM/<: CPT

## 2017-11-16 PROCEDURE — 27201912 HC WOUND CARE DEBRIDEMENT SUPPLIES

## 2017-11-16 NOTE — PROGRESS NOTES
[facility]  Wound Care  Progress Note    Problem List Items Addressed This Visit     Diabetes mellitus due to underlying condition with foot ulcer - Primary    Overview     History of Present Illness      Description:  Description:  Location: left foot metatarsal head   Duration: Jan. 2016   Context: Had cellulitis and in hospital Jan 2016   Modifying Factors: Diabetes   Pt does lots of gardening and landscaping and doesn't stay off feet enough.   Wound left fifth toe healed. Ulcer medial large toe healed.                See wound doc progress notes. Documents will be scanned.        Antonio Hidalgo Jr  [facility]

## 2017-11-30 ENCOUNTER — OFFICE VISIT (OUTPATIENT)
Dept: WOUND CARE | Facility: HOSPITAL | Age: 72
End: 2017-11-30
Attending: SURGERY
Payer: MEDICARE

## 2017-11-30 VITALS
SYSTOLIC BLOOD PRESSURE: 141 MMHG | DIASTOLIC BLOOD PRESSURE: 77 MMHG | RESPIRATION RATE: 20 BRPM | TEMPERATURE: 98 F | HEART RATE: 65 BPM

## 2017-11-30 DIAGNOSIS — E08.621 DIABETES MELLITUS DUE TO UNDERLYING CONDITION WITH FOOT ULCER, UNSPECIFIED LONG TERM INSULIN USE STATUS: Primary | ICD-10-CM

## 2017-11-30 DIAGNOSIS — L97.522 SKIN ULCER OF LEFT FOOT WITH FAT LAYER EXPOSED: ICD-10-CM

## 2017-11-30 DIAGNOSIS — L97.522 NON-HEALING ULCER OF FOOT, LEFT, WITH FAT LAYER EXPOSED: ICD-10-CM

## 2017-11-30 DIAGNOSIS — L97.509 DIABETES MELLITUS DUE TO UNDERLYING CONDITION WITH FOOT ULCER, UNSPECIFIED LONG TERM INSULIN USE STATUS: Primary | ICD-10-CM

## 2017-11-30 PROCEDURE — 11042 DBRDMT SUBQ TIS 1ST 20SQCM/<: CPT

## 2017-11-30 PROCEDURE — 27201912 HC WOUND CARE DEBRIDEMENT SUPPLIES

## 2017-12-14 ENCOUNTER — OFFICE VISIT (OUTPATIENT)
Dept: WOUND CARE | Facility: HOSPITAL | Age: 72
End: 2017-12-14
Attending: SURGERY
Payer: MEDICARE

## 2017-12-14 VITALS — HEART RATE: 57 BPM | DIASTOLIC BLOOD PRESSURE: 79 MMHG | RESPIRATION RATE: 20 BRPM | SYSTOLIC BLOOD PRESSURE: 122 MMHG

## 2017-12-14 DIAGNOSIS — L97.522 ULCER OF LEFT FOOT, WITH FAT LAYER EXPOSED: Primary | ICD-10-CM

## 2017-12-14 PROCEDURE — 11042 DBRDMT SUBQ TIS 1ST 20SQCM/<: CPT

## 2017-12-14 PROCEDURE — 27201912 HC WOUND CARE DEBRIDEMENT SUPPLIES

## 2017-12-28 ENCOUNTER — OFFICE VISIT (OUTPATIENT)
Dept: WOUND CARE | Facility: HOSPITAL | Age: 72
End: 2017-12-28
Attending: SURGERY
Payer: MEDICARE

## 2017-12-28 VITALS
TEMPERATURE: 98 F | SYSTOLIC BLOOD PRESSURE: 138 MMHG | DIASTOLIC BLOOD PRESSURE: 78 MMHG | HEART RATE: 82 BPM | RESPIRATION RATE: 18 BRPM

## 2017-12-28 DIAGNOSIS — L97.522 PLANTAR ULCER OF LEFT FOOT, WITH FAT LAYER EXPOSED: ICD-10-CM

## 2017-12-28 DIAGNOSIS — L97.509 DIABETES MELLITUS DUE TO UNDERLYING CONDITION WITH FOOT ULCER, UNSPECIFIED LONG TERM INSULIN USE STATUS: Primary | ICD-10-CM

## 2017-12-28 DIAGNOSIS — E08.621 DIABETES MELLITUS DUE TO UNDERLYING CONDITION WITH FOOT ULCER, UNSPECIFIED LONG TERM INSULIN USE STATUS: Primary | ICD-10-CM

## 2017-12-28 PROCEDURE — 11042 DBRDMT SUBQ TIS 1ST 20SQCM/<: CPT

## 2017-12-28 PROCEDURE — 27201912 HC WOUND CARE DEBRIDEMENT SUPPLIES

## 2018-01-11 ENCOUNTER — HOSPITAL ENCOUNTER (OUTPATIENT)
Dept: RADIOLOGY | Facility: HOSPITAL | Age: 73
Discharge: HOME | End: 2018-01-11
Attending: SURGERY
Payer: MEDICARE

## 2018-01-11 ENCOUNTER — OFFICE VISIT (OUTPATIENT)
Dept: WOUND CARE | Facility: HOSPITAL | Age: 73
End: 2018-01-11
Attending: SURGERY
Payer: MEDICARE

## 2018-01-11 VITALS
TEMPERATURE: 98 F | DIASTOLIC BLOOD PRESSURE: 69 MMHG | RESPIRATION RATE: 20 BRPM | HEART RATE: 54 BPM | SYSTOLIC BLOOD PRESSURE: 120 MMHG

## 2018-01-11 DIAGNOSIS — E08.621 DIABETES MELLITUS DUE TO UNDERLYING CONDITION WITH FOOT ULCER: ICD-10-CM

## 2018-01-11 DIAGNOSIS — L97.522 PLANTAR ULCER OF LEFT FOOT, WITH FAT LAYER EXPOSED: ICD-10-CM

## 2018-01-11 DIAGNOSIS — E11.621 DIABETIC ULCER OF LEFT GREAT TOE: ICD-10-CM

## 2018-01-11 DIAGNOSIS — L97.509 DIABETES MELLITUS DUE TO UNDERLYING CONDITION WITH FOOT ULCER: ICD-10-CM

## 2018-01-11 DIAGNOSIS — L97.529 DIABETIC ULCER OF LEFT GREAT TOE: ICD-10-CM

## 2018-01-11 DIAGNOSIS — L97.522 SKIN ULCER OF LEFT FOOT WITH FAT LAYER EXPOSED: Primary | ICD-10-CM

## 2018-01-11 PROCEDURE — 73630 X-RAY EXAM OF FOOT: CPT | Mod: TC,LT

## 2018-01-11 PROCEDURE — 73630 X-RAY EXAM OF FOOT: CPT | Mod: 26,LT,, | Performed by: RADIOLOGY

## 2018-01-11 PROCEDURE — 27201912 HC WOUND CARE DEBRIDEMENT SUPPLIES

## 2018-01-11 PROCEDURE — 11042 DBRDMT SUBQ TIS 1ST 20SQCM/<: CPT

## 2018-01-11 RX ORDER — SULFAMETHOXAZOLE AND TRIMETHOPRIM 800; 160 MG/1; MG/1
1 TABLET ORAL 2 TIMES DAILY
COMMUNITY
Start: 2018-01-11 | End: 2018-01-24

## 2018-01-25 ENCOUNTER — OFFICE VISIT (OUTPATIENT)
Dept: WOUND CARE | Facility: HOSPITAL | Age: 73
End: 2018-01-25
Attending: SURGERY
Payer: MEDICARE

## 2018-01-25 VITALS
HEART RATE: 54 BPM | SYSTOLIC BLOOD PRESSURE: 151 MMHG | TEMPERATURE: 98 F | RESPIRATION RATE: 20 BRPM | DIASTOLIC BLOOD PRESSURE: 85 MMHG

## 2018-01-25 DIAGNOSIS — L97.522 PLANTAR ULCER OF LEFT FOOT, WITH FAT LAYER EXPOSED: ICD-10-CM

## 2018-01-25 DIAGNOSIS — L97.509 DIABETES MELLITUS DUE TO UNDERLYING CONDITION WITH FOOT ULCER, UNSPECIFIED LONG TERM INSULIN USE STATUS: ICD-10-CM

## 2018-01-25 DIAGNOSIS — L97.522 SKIN ULCER OF LEFT FOOT WITH FAT LAYER EXPOSED: Primary | ICD-10-CM

## 2018-01-25 DIAGNOSIS — E08.621 DIABETES MELLITUS DUE TO UNDERLYING CONDITION WITH FOOT ULCER, UNSPECIFIED LONG TERM INSULIN USE STATUS: ICD-10-CM

## 2018-01-25 PROCEDURE — 11042 DBRDMT SUBQ TIS 1ST 20SQCM/<: CPT

## 2018-01-25 PROCEDURE — 27201912 HC WOUND CARE DEBRIDEMENT SUPPLIES

## 2018-01-25 NOTE — PROGRESS NOTES
[facility]  Wound Care  Progress Note    Problem List Items Addressed This Visit     Diabetes mellitus due to underlying condition with foot ulcer    Overview     History of Present Illness      Description:  Description:  Location: left foot metatarsal head   Duration: Jan. 2016   Context: Had cellulitis and in hospital Jan 2016   Modifying Factors: Diabetes   Pt does lots of gardening and landscaping and doesn't stay off feet enough. Much improved. Toe appears to be resolved and plantar ulcer is getting smaller with no drainage.                See wound doc progress notes. Documents will be scanned.        Gonzalez Alvarado  [facility]

## 2018-02-08 ENCOUNTER — OFFICE VISIT (OUTPATIENT)
Dept: WOUND CARE | Facility: HOSPITAL | Age: 73
End: 2018-02-08
Attending: SURGERY
Payer: MEDICARE

## 2018-02-08 VITALS
TEMPERATURE: 98 F | SYSTOLIC BLOOD PRESSURE: 127 MMHG | DIASTOLIC BLOOD PRESSURE: 81 MMHG | RESPIRATION RATE: 18 BRPM | HEART RATE: 57 BPM

## 2018-02-08 DIAGNOSIS — L97.509 DIABETES MELLITUS DUE TO UNDERLYING CONDITION WITH FOOT ULCER, UNSPECIFIED LONG TERM INSULIN USE STATUS: Primary | ICD-10-CM

## 2018-02-08 DIAGNOSIS — L97.522 PLANTAR ULCER OF LEFT FOOT, WITH FAT LAYER EXPOSED: ICD-10-CM

## 2018-02-08 DIAGNOSIS — E08.621 DIABETES MELLITUS DUE TO UNDERLYING CONDITION WITH FOOT ULCER, UNSPECIFIED LONG TERM INSULIN USE STATUS: Primary | ICD-10-CM

## 2018-02-08 PROCEDURE — 27201912 HC WOUND CARE DEBRIDEMENT SUPPLIES

## 2018-02-08 PROCEDURE — 11042 DBRDMT SUBQ TIS 1ST 20SQCM/<: CPT

## 2018-02-08 NOTE — ASSESSMENT & PLAN NOTE
Wound on plantar left foot much smaller. On IV antibiotics for left large toe which is now healed.

## 2018-02-08 NOTE — PROGRESS NOTES
[facility]  Wound Care  Progress Note    Problem List Items Addressed This Visit     Diabetes mellitus due to underlying condition with foot ulcer    Overview     History of Present Illness      Description:  Description:  Location: left foot metatarsal head   Duration: Jan. 2016   Context: Had cellulitis and in hospital Jan 2016   Modifying Factors: Diabetes   Pt does lots of gardening and landscaping and doesn't stay off feet enough. Much improved. Toe appears to be resolved and plantar ulcer is getting smaller with no drainage.          Current Assessment & Plan     Wound on plantar left foot much smaller. On IV antibiotics for left large toe which is now healed.                See wound doc progress notes. Documents will be scanned.        Gonzalez Alvarado  [facility]

## 2018-02-22 ENCOUNTER — OFFICE VISIT (OUTPATIENT)
Dept: WOUND CARE | Facility: HOSPITAL | Age: 73
End: 2018-02-22
Attending: SURGERY
Payer: MEDICARE

## 2018-02-22 VITALS
DIASTOLIC BLOOD PRESSURE: 80 MMHG | SYSTOLIC BLOOD PRESSURE: 130 MMHG | TEMPERATURE: 98 F | HEART RATE: 54 BPM | RESPIRATION RATE: 18 BRPM

## 2018-02-22 DIAGNOSIS — L97.509 DIABETES MELLITUS DUE TO UNDERLYING CONDITION WITH FOOT ULCER, UNSPECIFIED LONG TERM INSULIN USE STATUS: ICD-10-CM

## 2018-02-22 DIAGNOSIS — E08.621 DIABETES MELLITUS DUE TO UNDERLYING CONDITION WITH FOOT ULCER, UNSPECIFIED LONG TERM INSULIN USE STATUS: ICD-10-CM

## 2018-02-22 PROCEDURE — 11042 DBRDMT SUBQ TIS 1ST 20SQCM/<: CPT

## 2018-02-22 PROCEDURE — 27201912 HC WOUND CARE DEBRIDEMENT SUPPLIES

## 2018-02-22 NOTE — PROGRESS NOTES
[facility]  Wound Care  Progress Note    Problem List Items Addressed This Visit     Diabetes mellitus due to underlying condition with foot ulcer    Overview     History of Present Illness      Description:Left plantar ulcer  Location: left foot metatarsal head   Duration: Jan. 2016   Context: Had cellulitis and in hospital Jan 2016   Modifying Factors: Diabetes   Were using Abx compound for 2 months.  Will stop and start Mepilex Ag                  See wound doc progress notes. Documents will be scanned.        Antonio Hidalgo Jr  [facility]

## 2018-03-08 ENCOUNTER — OFFICE VISIT (OUTPATIENT)
Dept: WOUND CARE | Facility: HOSPITAL | Age: 73
End: 2018-03-08
Attending: SURGERY
Payer: MEDICARE

## 2018-03-08 VITALS
TEMPERATURE: 97 F | SYSTOLIC BLOOD PRESSURE: 126 MMHG | RESPIRATION RATE: 18 BRPM | DIASTOLIC BLOOD PRESSURE: 79 MMHG | HEART RATE: 60 BPM

## 2018-03-08 DIAGNOSIS — L97.522 PLANTAR ULCER OF LEFT FOOT, WITH FAT LAYER EXPOSED: ICD-10-CM

## 2018-03-08 DIAGNOSIS — L97.509 DIABETES MELLITUS DUE TO UNDERLYING CONDITION WITH FOOT ULCER, UNSPECIFIED LONG TERM INSULIN USE STATUS: Primary | ICD-10-CM

## 2018-03-08 DIAGNOSIS — E08.621 DIABETES MELLITUS DUE TO UNDERLYING CONDITION WITH FOOT ULCER, UNSPECIFIED LONG TERM INSULIN USE STATUS: Primary | ICD-10-CM

## 2018-03-08 PROCEDURE — 27201912 HC WOUND CARE DEBRIDEMENT SUPPLIES

## 2018-03-08 PROCEDURE — 11042 DBRDMT SUBQ TIS 1ST 20SQCM/<: CPT

## 2018-03-08 NOTE — PROGRESS NOTES
[facility]  Wound Care  Progress Note    Problem List Items Addressed This Visit     Diabetes mellitus due to underlying condition with foot ulcer    Overview     History of Present Illness      Description:Left plantar ulcer  Location: left foot metatarsal head   Duration: Jan. 2016   Context: Had cellulitis and in hospital Jan 2016   Modifying Factors: Diabetes   Were using Abx compound for 2 months.  Will stop and start Mepilex Ag            Current Assessment & Plan     Wound seems more shallow. Was changed to Mepelex silver. Continue offloading.               See wound doc progress notes. Documents will be scanned.        Gonzalez Alvarado  [facility]

## 2018-03-22 ENCOUNTER — OFFICE VISIT (OUTPATIENT)
Dept: WOUND CARE | Facility: HOSPITAL | Age: 73
End: 2018-03-22
Attending: SURGERY
Payer: MEDICARE

## 2018-03-22 VITALS
SYSTOLIC BLOOD PRESSURE: 132 MMHG | RESPIRATION RATE: 18 BRPM | DIASTOLIC BLOOD PRESSURE: 84 MMHG | HEART RATE: 87 BPM | TEMPERATURE: 98 F

## 2018-03-22 DIAGNOSIS — L97.522 PLANTAR ULCER OF LEFT FOOT, WITH FAT LAYER EXPOSED: Primary | ICD-10-CM

## 2018-03-22 DIAGNOSIS — L97.509 DIABETES MELLITUS DUE TO UNDERLYING CONDITION WITH FOOT ULCER, UNSPECIFIED LONG TERM INSULIN USE STATUS: ICD-10-CM

## 2018-03-22 DIAGNOSIS — E08.621 DIABETES MELLITUS DUE TO UNDERLYING CONDITION WITH FOOT ULCER, UNSPECIFIED LONG TERM INSULIN USE STATUS: ICD-10-CM

## 2018-03-22 PROCEDURE — 11042 DBRDMT SUBQ TIS 1ST 20SQCM/<: CPT

## 2018-03-22 PROCEDURE — 27201912 HC WOUND CARE DEBRIDEMENT SUPPLIES

## 2018-03-22 NOTE — PROGRESS NOTES
[facility]  Wound Care  Progress Note    Problem List Items Addressed This Visit     Diabetes mellitus due to underlying condition with foot ulcer    Overview     History of Present Illness      Description:Left plantar ulcer  Location: left foot metatarsal head   Duration: Jan. 2016   Context: Had cellulitis and in hospital Jan 2016   Modifying Factors: Diabetes   Were using Abx compound for 2 months.  Will stop and start Mepilex Ag            Current Assessment & Plan     Started Mepilex last week. Wound about same.                See wound doc progress notes. Documents will be scanned.        Gonzalez Alvarado  [facility]

## 2018-04-05 ENCOUNTER — OFFICE VISIT (OUTPATIENT)
Dept: WOUND CARE | Facility: HOSPITAL | Age: 73
End: 2018-04-05
Attending: SURGERY
Payer: MEDICARE

## 2018-04-05 VITALS
SYSTOLIC BLOOD PRESSURE: 131 MMHG | RESPIRATION RATE: 18 BRPM | HEART RATE: 59 BPM | DIASTOLIC BLOOD PRESSURE: 79 MMHG | TEMPERATURE: 98 F

## 2018-04-05 DIAGNOSIS — L97.509 DIABETES MELLITUS DUE TO UNDERLYING CONDITION WITH FOOT ULCER, UNSPECIFIED LONG TERM INSULIN USE STATUS: ICD-10-CM

## 2018-04-05 DIAGNOSIS — L97.522 PLANTAR ULCER OF LEFT FOOT, WITH FAT LAYER EXPOSED: Primary | ICD-10-CM

## 2018-04-05 DIAGNOSIS — E08.621 DIABETES MELLITUS DUE TO UNDERLYING CONDITION WITH FOOT ULCER, UNSPECIFIED LONG TERM INSULIN USE STATUS: ICD-10-CM

## 2018-04-05 PROCEDURE — 27201912 HC WOUND CARE DEBRIDEMENT SUPPLIES

## 2018-04-05 PROCEDURE — 11042 DBRDMT SUBQ TIS 1ST 20SQCM/<: CPT

## 2018-04-05 NOTE — ASSESSMENT & PLAN NOTE
Patient not offloading. Has not gotten shoes as ordered. Wound the same. Continue Mepilex and try to get offloading done otherwise will not improve.

## 2018-04-05 NOTE — PROGRESS NOTES
[facility]  Wound Care  Progress Note    Problem List Items Addressed This Visit     Diabetes mellitus due to underlying condition with foot ulcer    Overview     History of Present Illness      Description:Left plantar ulcer  Location: left foot metatarsal head   Duration: Jan. 2016   Context: Had cellulitis and in hospital Jan 2016   Modifying Factors: Diabetes   Were using Abx compound for 2 months.  Will stop and start Mepilex Ag            Current Assessment & Plan     Patient not offloading. Has not gotten shoes as ordered. Wound the same. Continue Mepilex and try to get offloading done otherwise will not improve.                See wound doc progress notes. Documents will be scanned.        Gonzalez Alvarado  [facility]

## 2018-04-19 ENCOUNTER — OFFICE VISIT (OUTPATIENT)
Dept: WOUND CARE | Facility: HOSPITAL | Age: 73
End: 2018-04-19
Attending: SURGERY
Payer: MEDICARE

## 2018-04-19 VITALS
SYSTOLIC BLOOD PRESSURE: 144 MMHG | TEMPERATURE: 98 F | DIASTOLIC BLOOD PRESSURE: 87 MMHG | RESPIRATION RATE: 18 BRPM | HEART RATE: 87 BPM

## 2018-04-19 DIAGNOSIS — E08.621 DIABETES MELLITUS DUE TO UNDERLYING CONDITION WITH FOOT ULCER, UNSPECIFIED LONG TERM INSULIN USE STATUS: ICD-10-CM

## 2018-04-19 DIAGNOSIS — L97.509 DIABETES MELLITUS DUE TO UNDERLYING CONDITION WITH FOOT ULCER, UNSPECIFIED LONG TERM INSULIN USE STATUS: ICD-10-CM

## 2018-04-19 PROCEDURE — 11042 DBRDMT SUBQ TIS 1ST 20SQCM/<: CPT

## 2018-04-19 PROCEDURE — 27201912 HC WOUND CARE DEBRIDEMENT SUPPLIES

## 2018-04-19 NOTE — PROGRESS NOTES
[facility]  Wound Care  Progress Note    Problem List Items Addressed This Visit     Diabetes mellitus due to underlying condition with foot ulcer    Overview     History of Present Illness      Description:Left plantar ulcer  Location: left foot metatarsal head   Duration: Jan. 2016   Context: Had cellulitis and in hospital Jan 2016   Modifying Factors: Diabetes   On Mepilex Ag  Continues to stall  Pt on his feet                   See wound doc progress notes. Documents will be scanned.        Antonio Hidalgo Jr  [facility]

## 2018-05-17 ENCOUNTER — OFFICE VISIT (OUTPATIENT)
Dept: WOUND CARE | Facility: HOSPITAL | Age: 73
End: 2018-05-17
Attending: SURGERY
Payer: MEDICARE

## 2018-05-17 VITALS
SYSTOLIC BLOOD PRESSURE: 158 MMHG | RESPIRATION RATE: 18 BRPM | TEMPERATURE: 98 F | DIASTOLIC BLOOD PRESSURE: 97 MMHG | HEART RATE: 65 BPM

## 2018-05-17 DIAGNOSIS — E08.621 DIABETES MELLITUS DUE TO UNDERLYING CONDITION WITH FOOT ULCER, UNSPECIFIED WHETHER LONG TERM INSULIN USE: Primary | ICD-10-CM

## 2018-05-17 DIAGNOSIS — L97.509 DIABETES MELLITUS DUE TO UNDERLYING CONDITION WITH FOOT ULCER, UNSPECIFIED WHETHER LONG TERM INSULIN USE: Primary | ICD-10-CM

## 2018-05-17 PROCEDURE — 97597 DBRDMT OPN WND 1ST 20 CM/<: CPT

## 2018-05-17 RX ORDER — PANTOPRAZOLE SODIUM 40 MG/1
40 TABLET, DELAYED RELEASE ORAL DAILY
COMMUNITY
End: 2022-06-23 | Stop reason: ALTCHOICE

## 2018-05-17 RX ORDER — CLOPIDOGREL BISULFATE 75 MG/1
75 TABLET ORAL DAILY
COMMUNITY

## 2018-05-17 RX ORDER — OXYCODONE AND ACETAMINOPHEN 5; 325 MG/1; MG/1
1 TABLET ORAL EVERY 4 HOURS PRN
COMMUNITY
End: 2019-02-14

## 2018-05-17 RX ORDER — TAMSULOSIN HYDROCHLORIDE 0.4 MG/1
0.4 CAPSULE ORAL DAILY
COMMUNITY

## 2018-05-17 NOTE — PROGRESS NOTES
[facility]  Wound Care  Progress Note    Problem List Items Addressed This Visit     Diabetes mellitus due to underlying condition with foot ulcer - Primary    Overview     History of Present Illness      Description:Left plantar ulcer  Location: left foot metatarsal head   Duration: Jan. 2016   Context: Had cellulitis and in hospital Jan 2016   Modifying Factors: Diabetes   On Mepilex Ag  Continues to stall  Pt on his feet                   See wound doc progress notes. Documents will be scanned.        Antonio Hidalgo Jr  [facility]

## 2018-05-31 ENCOUNTER — OFFICE VISIT (OUTPATIENT)
Dept: WOUND CARE | Facility: HOSPITAL | Age: 73
End: 2018-05-31
Attending: SURGERY
Payer: MEDICARE

## 2018-05-31 VITALS — HEART RATE: 64 BPM | SYSTOLIC BLOOD PRESSURE: 168 MMHG | DIASTOLIC BLOOD PRESSURE: 105 MMHG | RESPIRATION RATE: 20 BRPM

## 2018-05-31 DIAGNOSIS — L97.509 DIABETES MELLITUS DUE TO UNDERLYING CONDITION WITH FOOT ULCER, UNSPECIFIED WHETHER LONG TERM INSULIN USE: Primary | ICD-10-CM

## 2018-05-31 DIAGNOSIS — E08.621 DIABETES MELLITUS DUE TO UNDERLYING CONDITION WITH FOOT ULCER, UNSPECIFIED WHETHER LONG TERM INSULIN USE: Primary | ICD-10-CM

## 2018-05-31 PROCEDURE — 27201912 HC WOUND CARE DEBRIDEMENT SUPPLIES

## 2018-05-31 PROCEDURE — 11042 DBRDMT SUBQ TIS 1ST 20SQCM/<: CPT

## 2018-06-14 ENCOUNTER — OFFICE VISIT (OUTPATIENT)
Dept: WOUND CARE | Facility: HOSPITAL | Age: 73
End: 2018-06-14
Attending: SURGERY
Payer: MEDICARE

## 2018-06-14 VITALS
DIASTOLIC BLOOD PRESSURE: 96 MMHG | HEART RATE: 70 BPM | RESPIRATION RATE: 18 BRPM | TEMPERATURE: 98 F | SYSTOLIC BLOOD PRESSURE: 172 MMHG

## 2018-06-14 DIAGNOSIS — E08.621 DIABETES MELLITUS DUE TO UNDERLYING CONDITION WITH FOOT ULCER, UNSPECIFIED WHETHER LONG TERM INSULIN USE: ICD-10-CM

## 2018-06-14 DIAGNOSIS — L97.509 DIABETES MELLITUS DUE TO UNDERLYING CONDITION WITH FOOT ULCER, UNSPECIFIED WHETHER LONG TERM INSULIN USE: ICD-10-CM

## 2018-06-14 PROCEDURE — 97597 DBRDMT OPN WND 1ST 20 CM/<: CPT

## 2018-06-14 RX ORDER — VALSARTAN 40 MG/1
40 TABLET ORAL DAILY
COMMUNITY
End: 2019-02-14

## 2018-06-14 NOTE — PROGRESS NOTES
[facility]  Wound Care  Progress Note    Problem List Items Addressed This Visit     Diabetes mellitus due to underlying condition with foot ulcer    Overview     History of Present Illness      Description:Left plantar ulcer  Location: left foot metatarsal head   Duration: Jan. 2016   Context: Had cellulitis and in hospital Jan 2016   Modifying Factors: Diabetes   On Mepilex Ag  Best it has looked b/c he is staying off his feet.                  See wound doc progress notes. Documents will be scanned.        Antonio Hidalgo Jr  [facility]

## 2018-06-28 ENCOUNTER — OFFICE VISIT (OUTPATIENT)
Dept: WOUND CARE | Facility: HOSPITAL | Age: 73
End: 2018-06-28
Attending: SURGERY
Payer: MEDICARE

## 2018-06-28 DIAGNOSIS — L97.509 DIABETES MELLITUS DUE TO UNDERLYING CONDITION WITH FOOT ULCER, UNSPECIFIED WHETHER LONG TERM INSULIN USE: Primary | ICD-10-CM

## 2018-06-28 DIAGNOSIS — E08.621 DIABETES MELLITUS DUE TO UNDERLYING CONDITION WITH FOOT ULCER, UNSPECIFIED WHETHER LONG TERM INSULIN USE: Primary | ICD-10-CM

## 2018-06-28 PROCEDURE — 27201912 HC WOUND CARE DEBRIDEMENT SUPPLIES

## 2018-06-28 PROCEDURE — 11042 DBRDMT SUBQ TIS 1ST 20SQCM/<: CPT

## 2018-06-28 NOTE — PROGRESS NOTES
[facility]  Wound Care  Progress Note    Problem List Items Addressed This Visit     Diabetes mellitus due to underlying condition with foot ulcer - Primary    Overview     History of Present Illness      Description:Left plantar ulcer  Location: left foot metatarsal head   Duration: Jan. 2016   Context: Had cellulitis and in hospital Jan 2016   Modifying Factors: Diabetes   On Mepilex Ag  Best it has looked b/c he is staying off his feet.            Current Assessment & Plan     Wound continues to get smaller. Continue to offload.                See wound doc progress notes. Documents will be scanned.        Gonzalez Alvarado  [facility]

## 2018-07-12 ENCOUNTER — OFFICE VISIT (OUTPATIENT)
Dept: WOUND CARE | Facility: HOSPITAL | Age: 73
End: 2018-07-12
Attending: SURGERY
Payer: MEDICARE

## 2018-07-12 VITALS
HEART RATE: 74 BPM | SYSTOLIC BLOOD PRESSURE: 126 MMHG | DIASTOLIC BLOOD PRESSURE: 72 MMHG | TEMPERATURE: 98 F | RESPIRATION RATE: 18 BRPM

## 2018-07-12 DIAGNOSIS — L97.509 DIABETES MELLITUS DUE TO UNDERLYING CONDITION WITH FOOT ULCER, UNSPECIFIED WHETHER LONG TERM INSULIN USE: ICD-10-CM

## 2018-07-12 DIAGNOSIS — E08.621 DIABETES MELLITUS DUE TO UNDERLYING CONDITION WITH FOOT ULCER, UNSPECIFIED WHETHER LONG TERM INSULIN USE: ICD-10-CM

## 2018-07-12 PROCEDURE — 27201912 HC WOUND CARE DEBRIDEMENT SUPPLIES

## 2018-07-12 PROCEDURE — 99499 UNLISTED E&M SERVICE: CPT | Mod: ,,, | Performed by: SURGERY

## 2018-07-12 PROCEDURE — 11042 DBRDMT SUBQ TIS 1ST 20SQCM/<: CPT

## 2018-07-12 RX ORDER — METOPROLOL SUCCINATE 25 MG/1
25 TABLET, EXTENDED RELEASE ORAL DAILY
COMMUNITY
End: 2019-02-14

## 2018-07-12 RX ORDER — ISOSORBIDE DINITRATE AND HYDRALAZINE HYDROCHLORIDE 37.5; 2 MG/1; MG/1
1 TABLET ORAL 3 TIMES DAILY
COMMUNITY
End: 2022-06-23 | Stop reason: CLARIF

## 2018-07-12 NOTE — PROGRESS NOTES
[facility]  Wound Care  Progress Note    Problem List Items Addressed This Visit     Diabetes mellitus due to underlying condition with foot ulcer    Overview     History of Present Illness      Description:Left plantar ulcer  Location: left foot metatarsal head   Duration: Jan. 2016   Context: Had cellulitis and in hospital Jan 2016   Modifying Factors: Diabetes   On Mepilex Ag  Looking good he is staying off his feet.  Continue Mepilex Ag.                  See wound doc progress notes. Documents will be scanned.        Antonio Hidalgo Jr  [facility]

## 2018-07-26 ENCOUNTER — OFFICE VISIT (OUTPATIENT)
Dept: WOUND CARE | Facility: HOSPITAL | Age: 73
End: 2018-07-26
Attending: SURGERY
Payer: MEDICARE

## 2018-07-26 VITALS
DIASTOLIC BLOOD PRESSURE: 68 MMHG | SYSTOLIC BLOOD PRESSURE: 106 MMHG | TEMPERATURE: 98 F | RESPIRATION RATE: 18 BRPM | HEART RATE: 66 BPM

## 2018-07-26 DIAGNOSIS — L97.522 PLANTAR ULCER OF LEFT FOOT, WITH FAT LAYER EXPOSED: Primary | ICD-10-CM

## 2018-07-26 DIAGNOSIS — E08.621 DIABETES MELLITUS DUE TO UNDERLYING CONDITION WITH FOOT ULCER, UNSPECIFIED WHETHER LONG TERM INSULIN USE: ICD-10-CM

## 2018-07-26 DIAGNOSIS — L97.509 DIABETES MELLITUS DUE TO UNDERLYING CONDITION WITH FOOT ULCER, UNSPECIFIED WHETHER LONG TERM INSULIN USE: ICD-10-CM

## 2018-07-26 PROCEDURE — 11042 DBRDMT SUBQ TIS 1ST 20SQCM/<: CPT

## 2018-07-26 PROCEDURE — 27201912 HC WOUND CARE DEBRIDEMENT SUPPLIES

## 2018-07-26 PROCEDURE — 99499 UNLISTED E&M SERVICE: CPT | Mod: ,,, | Performed by: SURGERY

## 2018-07-26 NOTE — ASSESSMENT & PLAN NOTE
Wound is much smaller. No callous this time. Debridement performed. Will need continued debridement. continue  Present plan as above.

## 2018-07-26 NOTE — PROGRESS NOTES
[facility]  Wound Care  Progress Note    Problem List Items Addressed This Visit     Diabetes mellitus due to underlying condition with foot ulcer    Overview     History of Present Illness      Description:Left plantar ulcer  Location: left foot metatarsal head   Duration: Jan. 2016   Context: Had cellulitis and in hospital Jan 2016   Modifying Factors: Diabetes   On Mepilex Ag  Looking good he is staying off his feet.  Continue Mepilex Ag.            Current Assessment & Plan     Wound is much smaller. No callous this time. Debridement performed. Will need continued debridement. continue  Present plan as above.            Other Visit Diagnoses     Plantar ulcer of left foot, with fat layer exposed    -  Primary          See wound doc progress notes. Documents will be scanned.        Gonzalez Alvarado  [facility]

## 2018-08-09 ENCOUNTER — OFFICE VISIT (OUTPATIENT)
Dept: WOUND CARE | Facility: HOSPITAL | Age: 73
End: 2018-08-09
Attending: SURGERY
Payer: MEDICARE

## 2018-08-09 VITALS
RESPIRATION RATE: 18 BRPM | HEART RATE: 88 BPM | TEMPERATURE: 98 F | SYSTOLIC BLOOD PRESSURE: 132 MMHG | DIASTOLIC BLOOD PRESSURE: 87 MMHG

## 2018-08-09 DIAGNOSIS — L97.509 DIABETES MELLITUS DUE TO UNDERLYING CONDITION WITH FOOT ULCER, UNSPECIFIED WHETHER LONG TERM INSULIN USE: ICD-10-CM

## 2018-08-09 DIAGNOSIS — E08.621 DIABETES MELLITUS DUE TO UNDERLYING CONDITION WITH FOOT ULCER, UNSPECIFIED WHETHER LONG TERM INSULIN USE: ICD-10-CM

## 2018-08-09 PROCEDURE — 99499 UNLISTED E&M SERVICE: CPT | Mod: ,,, | Performed by: SURGERY

## 2018-08-09 PROCEDURE — 99211 OFF/OP EST MAY X REQ PHY/QHP: CPT

## 2018-08-09 NOTE — PROGRESS NOTES
[facility]  Wound Care  Progress Note    Problem List Items Addressed This Visit     Diabetes mellitus due to underlying condition with foot ulcer    Overview     History of Present Illness      Description:Left plantar ulcer  Location: left foot metatarsal head   Duration: Jan. 2016   Context: Had cellulitis and in hospital Jan 2016   Modifying Factors: Diabetes   On Mepilex Ag  Looking good he is staying off his feet.  Continue Mepilex Ag.  Did not need to debride today.  The wound is nearly closed.                   See wound doc progress notes. Documents will be scanned.        Antonio Hidalgo Jr  [facility]

## 2018-09-06 ENCOUNTER — OFFICE VISIT (OUTPATIENT)
Dept: WOUND CARE | Facility: HOSPITAL | Age: 73
End: 2018-09-06
Attending: SURGERY
Payer: MEDICARE

## 2018-09-06 DIAGNOSIS — E08.621 DIABETES MELLITUS DUE TO UNDERLYING CONDITION WITH FOOT ULCER, UNSPECIFIED WHETHER LONG TERM INSULIN USE: ICD-10-CM

## 2018-09-06 DIAGNOSIS — L97.509 DIABETES MELLITUS DUE TO UNDERLYING CONDITION WITH FOOT ULCER, UNSPECIFIED WHETHER LONG TERM INSULIN USE: ICD-10-CM

## 2018-09-06 PROCEDURE — 99499 UNLISTED E&M SERVICE: CPT | Mod: ,,, | Performed by: SURGERY

## 2018-09-06 PROCEDURE — 97597 DBRDMT OPN WND 1ST 20 CM/<: CPT

## 2018-09-06 NOTE — PROGRESS NOTES
[facility]  Wound Care  Progress Note    Problem List Items Addressed This Visit     Diabetes mellitus due to underlying condition with foot ulcer    Overview     History of Present Illness      Description:Left plantar ulcer  Location: left foot metatarsal head   Duration: Jan. 2016   Context: Had cellulitis and in hospital Jan 2016   Modifying Factors: Diabetes   On Mepilex Ag  Looking good he is staying off his feet.  Continue Mepilex Ag.  Removed callus today.  The wound   May be closed.  I will have him come back in 2 weeks                  See wound doc progress notes. Documents will be scanned.        Antonio Hidalgo Jr  [facility]

## 2018-09-20 ENCOUNTER — OFFICE VISIT (OUTPATIENT)
Dept: WOUND CARE | Facility: HOSPITAL | Age: 73
End: 2018-09-20
Attending: SURGERY
Payer: MEDICARE

## 2018-09-20 VITALS
HEART RATE: 121 BPM | TEMPERATURE: 98 F | RESPIRATION RATE: 18 BRPM | SYSTOLIC BLOOD PRESSURE: 171 MMHG | DIASTOLIC BLOOD PRESSURE: 113 MMHG

## 2018-09-20 DIAGNOSIS — E08.621 DIABETES MELLITUS DUE TO UNDERLYING CONDITION WITH FOOT ULCER, UNSPECIFIED WHETHER LONG TERM INSULIN USE: ICD-10-CM

## 2018-09-20 DIAGNOSIS — L97.509 DIABETES MELLITUS DUE TO UNDERLYING CONDITION WITH FOOT ULCER, UNSPECIFIED WHETHER LONG TERM INSULIN USE: ICD-10-CM

## 2018-09-20 DIAGNOSIS — L97.522 PLANTAR ULCER OF LEFT FOOT, WITH FAT LAYER EXPOSED: Primary | ICD-10-CM

## 2018-09-20 PROCEDURE — 99499 UNLISTED E&M SERVICE: CPT | Mod: ,,, | Performed by: SURGERY

## 2018-09-20 PROCEDURE — 99211 OFF/OP EST MAY X REQ PHY/QHP: CPT

## 2018-09-20 NOTE — PROGRESS NOTES
[facility]  Wound Care  Progress Note    Problem List Items Addressed This Visit     Diabetes mellitus due to underlying condition with foot ulcer    Overview     History of Present Illness      Description:Left plantar ulcer  Location: left foot metatarsal head   Duration: Jan. 2016   Context: Had cellulitis and in hospital Jan 2016   Modifying Factors: Diabetes   On Mepilex Ag  Looking good he is staying off his feet.  Continue Mepilex Ag.  Removed callus today.  The wound   May be closed.  I will have him come back in 2 weeks            Current Assessment & Plan     Wound resolved. D/c continue to offload with special shoes ordered.            Other Visit Diagnoses     Plantar ulcer of left foot, with fat layer exposed    -  Primary          See wound doc progress notes. Documents will be scanned.        Gonzalez Alvarado  [facility]

## 2019-02-14 ENCOUNTER — OFFICE VISIT (OUTPATIENT)
Dept: WOUND CARE | Facility: HOSPITAL | Age: 74
End: 2019-02-14
Attending: SURGERY
Payer: MEDICARE

## 2019-02-14 VITALS
RESPIRATION RATE: 20 BRPM | DIASTOLIC BLOOD PRESSURE: 80 MMHG | HEART RATE: 65 BPM | TEMPERATURE: 98 F | SYSTOLIC BLOOD PRESSURE: 125 MMHG

## 2019-02-14 DIAGNOSIS — L97.422 DIABETIC ULCER OF LEFT MIDFOOT ASSOCIATED WITH TYPE 2 DIABETES MELLITUS, WITH FAT LAYER EXPOSED: ICD-10-CM

## 2019-02-14 DIAGNOSIS — E11.621 DIABETIC ULCER OF LEFT MIDFOOT ASSOCIATED WITH TYPE 2 DIABETES MELLITUS, WITH FAT LAYER EXPOSED: ICD-10-CM

## 2019-02-14 DIAGNOSIS — E11.610 CHARCOT'S JOINT ARTHROPATHY IN TYPE 2 DIABETES MELLITUS: ICD-10-CM

## 2019-02-14 DIAGNOSIS — L97.422 CHRONIC ULCER OF LEFT MIDFOOT WITH FAT LAYER EXPOSED: Primary | ICD-10-CM

## 2019-02-14 PROCEDURE — 27201912 HC WOUND CARE DEBRIDEMENT SUPPLIES

## 2019-02-14 PROCEDURE — 99499 NO LOS: ICD-10-PCS | Mod: ,,, | Performed by: SURGERY

## 2019-02-14 PROCEDURE — 11042 DBRDMT SUBQ TIS 1ST 20SQCM/<: CPT

## 2019-02-14 PROCEDURE — 99499 UNLISTED E&M SERVICE: CPT | Mod: ,,, | Performed by: SURGERY

## 2019-02-14 PROCEDURE — 99214 OFFICE O/P EST MOD 30 MIN: CPT | Mod: 25

## 2019-02-14 RX ORDER — TORSEMIDE 20 MG/1
20 TABLET ORAL DAILY
COMMUNITY
End: 2022-06-23 | Stop reason: ALTCHOICE

## 2019-02-14 RX ORDER — SOTALOL HYDROCHLORIDE 80 MG/1
80 TABLET ORAL 2 TIMES DAILY
COMMUNITY
End: 2022-06-23 | Stop reason: ALTCHOICE

## 2019-02-14 NOTE — PROGRESS NOTES
[facility]  Wound Care  Progress Note    Problem List Items Addressed This Visit        Derm    Chronic ulcer of left midfoot with fat layer exposed - Primary       Endocrine    Diabetic ulcer of left midfoot with fat layer exposed    Overview     History of Present Illness  Patient has a long history of recurring ulcerations of his left plantar foot associated with a bony deformity associated with the left 2nd metatarsal head.  Patient [facility] was [facility] in September of 2018.  At that time, he did have inserts made to relieve pressure.  The patient admits he has not been utilizing his inserts at all.  This is been ongoing for several months.  As of 2/14/2019, he has subsequently developed another wound in the same location of the 2nd metatarsal head protrusion.  The patient has noted drainage for several weeks on his sock upon admission.  He had been applying promogram to the area as he had promogram remaining at home..      Description:Left plantar ulcer  Location: left foot 2nd metatarsal head   Duration:  January 2019   Context: Had cellulitis and in hospital Jan 2016.  Wound last healed September 2018  Modifying Factors: Diabetes and foot deformity as well as noncompliance               Orthopedic    Charcot's joint arthropathy in type 2 diabetes mellitus    Overview     Patient has long-time foot deformity on the left. He has plantar protrusion of the left 2nd metatarsal head.  This has made him prone to ulceration in this region.  On 02/14/2019, I have recommended to the patient either long-term use of offloading diabetic shoes with inserts or surgery to correct the foot deformity by a podiatrist.  He would like to utilize diabetic shoes.  He has been instructed to obtain orders for diabetic shoes from his primary care physician Dr. Chowdhury.               See wound doc progress notes. Documents will be scanned.        Man Ordaz  [facility]

## 2019-02-14 NOTE — H&P
[facility]  Wound Care  History and Physical    Problem List Items Addressed This Visit        Derm    Chronic ulcer of left midfoot with fat layer exposed - Primary       Endocrine    Diabetic ulcer of left midfoot with fat layer exposed    Overview     History of Present Illness  Patient has a long history of recurring ulcerations of his left plantar foot associated with a bony deformity associated with the left 2nd metatarsal head.  Patient previously was discharged from the [facility]ound Clinic in September of 2018.  At that time, he did have inserts made to relieve pressure.  The patient admits he has not been utilizing his inserts at all.  This is been ongoing for several months.  As of 2/14/2019, he has subsequently developed another wound in the same location of the 2nd metatarsal head protrusion.  The patient has noted drainage for several weeks on his sock upon admission.  He had been applying promogram to the area as he had promogram remaining at home..      Description:Left plantar ulcer  Location: left foot 2nd metatarsal head   Duration:  January 2019   Context: Had cellulitis and in hospital Jan 2016.  Wound last healed September 2018  Modifying Factors: Diabetes and foot deformity as well as noncompliance               Orthopedic    Charcot's joint arthropathy in type 2 diabetes mellitus    Overview     Patient has long-time foot deformity on the left. He has plantar protrusion of the left 2nd metatarsal head.  This has made him prone to ulceration in this region.  On 02/14/2019, I have recommended to the patient either long-term use of offloading diabetic shoes with inserts or surgery to correct the foot deformity by a podiatrist.  He would like to utilize diabetic shoes.  He has been instructed to obtain orders for diabetic shoes from his primary care physician Dr. Chowdhury.                 History:    Past Medical History:   Diagnosis Date    Diabetes mellitus, type 2      Hypertension     Myocardial infarction     4/21/18    Syncope        Past Surgical History:   Procedure Laterality Date    APPENDECTOMY      cardiac triple bypass  04/23/2018    CORONARY ANGIOPLASTY WITH STENT PLACEMENT         Family History   Problem Relation Age of Onset    Cancer Mother     Diabetes Mother     Stroke Father     Diabetes Sister         reports that he has quit smoking. he has never used smokeless tobacco. He reports that he does not drink alcohol or use drugs.    has a current medication list which includes the following prescription(s): amiodarone, amlodipine, apixaban, ascorbic acid (vitamin c), atorvastatin, becaplermin, clopidogrel, doxazosin, glimepiride, hydrochlorothiazide, insulin detemir u-100, isosorbide-hydralazine 20-37.5 mg, metformin, metformin, metoprolol succinate, multivit-min-fa-lycopen-lutein, oxycodone-acetaminophen, pantoprazole, sitagliptin, tamsulosin, valsartan, and vitamin d.    Allergies:  Asa [aspirin]; Bacitracin; Neomycin; Neosporin [benzalkonium chloride]; and Polymycin    Review of Systems:  Review of Systems   Constitutional: Negative for chills, fever and weight loss.   Cardiovascular: Negative for chest pain, palpitations, orthopnea and leg swelling.   Skin: Negative for rash.         There were no vitals filed for this visit.      BMI:  There is no height or weight on file to calculate BMI.    Physical Exam:  Physical Exam   Constitutional: He appears well-developed and well-nourished. No distress.   HENT:   Head: Normocephalic and atraumatic.   Neck: Normal range of motion. No JVD present. No tracheal deviation present. No thyromegaly present.   Cardiovascular: Normal rate and regular rhythm.   Pulmonary/Chest: Effort normal and breath sounds normal.   Musculoskeletal: He exhibits no edema.   Patient has a left foot deformity with flattening of his arch and protrusion of the left 2nd metatarsal head onto the plantar surface. He has no mobility in the  toes of his foot.  See wound progress note for detailed wound description.         A1C:  No results for input(s): HGBA1C in the last 2160 hours.  BMP:  No results for input(s): GLU, NA, K, CL, CO2, BUN, CREATININE, CALCIUM, MG in the last 2160 hours.   CBC:  No results for input(s): WBC, RBC, HGB, HCT, PLT, MCV, MCH, MCHC in the last 2160 hours.  CMP:  No results for input(s): GLU, CALCIUM, ALBUMIN, PROT, NA, K, CO2, CL, BUN, ALKPHOS, ALT, AST, BILITOT in the last 2160 hours.    Invalid input(s): CREATININ  PREALBUMIN:  No results for input(s): PREALBUMIN in the last 2160 hours.  WOUND CULTURES:  No results for input(s): LABAERO in the last 2160 hours.        Plan:  See Wound Docs note for plan and follow up.        Man COTE Ordaz  [facility]

## 2019-02-21 ENCOUNTER — OFFICE VISIT (OUTPATIENT)
Dept: WOUND CARE | Facility: HOSPITAL | Age: 74
End: 2019-02-21
Attending: SURGERY
Payer: MEDICARE

## 2019-02-21 VITALS — HEART RATE: 61 BPM | RESPIRATION RATE: 18 BRPM | DIASTOLIC BLOOD PRESSURE: 83 MMHG | SYSTOLIC BLOOD PRESSURE: 151 MMHG

## 2019-02-21 DIAGNOSIS — L97.422 DIABETIC ULCER OF LEFT MIDFOOT ASSOCIATED WITH TYPE 2 DIABETES MELLITUS, WITH FAT LAYER EXPOSED: ICD-10-CM

## 2019-02-21 DIAGNOSIS — E11.621 DIABETIC ULCER OF LEFT MIDFOOT ASSOCIATED WITH TYPE 2 DIABETES MELLITUS, WITH FAT LAYER EXPOSED: ICD-10-CM

## 2019-02-21 PROCEDURE — 99499 NO LOS: ICD-10-PCS | Mod: ,,, | Performed by: SURGERY

## 2019-02-21 PROCEDURE — 97597 DBRDMT OPN WND 1ST 20 CM/<: CPT

## 2019-02-21 PROCEDURE — 99499 UNLISTED E&M SERVICE: CPT | Mod: ,,, | Performed by: SURGERY

## 2019-02-21 NOTE — PROGRESS NOTES
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  Patient previously was discharged from the Wound Clinic in September of 2018.  At that time, he did have inserts made to relieve pressure.  The patient admits he has not been utilizing his inserts at all.  This is been ongoing for several months.  As of 2/14/2019, he has subsequently developed another wound in the same location of the 2nd metatarsal head protrusion.  The patient has noted drainage for several weeks on his sock upon admission.  He had been applying promogram to the area as he had promogram remaining at home.  Will add Mepilex Ag foam to the promogran.      Description:Left plantar ulcer  Location: left foot 2nd metatarsal head   Duration:  January 2019   Context: Had cellulitis and in hospital Jan 2016.  Wound last healed September 2018  Modifying Factors: Diabetes and foot deformity as well as noncompliance                  See wound doc progress notes. Documents will be scanned.        Antonio Hidalgo Jr  [facility]

## 2019-02-28 ENCOUNTER — OFFICE VISIT (OUTPATIENT)
Dept: WOUND CARE | Facility: HOSPITAL | Age: 74
End: 2019-02-28
Attending: SURGERY
Payer: MEDICARE

## 2019-02-28 VITALS — RESPIRATION RATE: 18 BRPM | HEART RATE: 68 BPM | DIASTOLIC BLOOD PRESSURE: 96 MMHG | SYSTOLIC BLOOD PRESSURE: 155 MMHG

## 2019-02-28 DIAGNOSIS — E11.621 DIABETIC ULCER OF LEFT MIDFOOT ASSOCIATED WITH TYPE 2 DIABETES MELLITUS, WITH FAT LAYER EXPOSED: ICD-10-CM

## 2019-02-28 DIAGNOSIS — L97.521 ULCER OF TOE OF LEFT FOOT, LIMITED TO BREAKDOWN OF SKIN: ICD-10-CM

## 2019-02-28 DIAGNOSIS — L97.422 DIABETIC ULCER OF LEFT MIDFOOT ASSOCIATED WITH TYPE 2 DIABETES MELLITUS, WITH FAT LAYER EXPOSED: ICD-10-CM

## 2019-02-28 PROCEDURE — 99499 UNLISTED E&M SERVICE: CPT | Mod: ,,, | Performed by: SURGERY

## 2019-02-28 PROCEDURE — 99499 NO LOS: ICD-10-PCS | Mod: ,,, | Performed by: SURGERY

## 2019-02-28 PROCEDURE — 27201912 HC WOUND CARE DEBRIDEMENT SUPPLIES

## 2019-02-28 PROCEDURE — 11042 DBRDMT SUBQ TIS 1ST 20SQCM/<: CPT

## 2019-02-28 NOTE — PROGRESS NOTES
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  Patient previously was discharged from the Wound Clinic in September of 2018.  At that time, he did have inserts made to relieve pressure.  The patient admits he has not been utilizing his inserts at all.  This is been ongoing for several months.  As of 2/14/2019, he has subsequently developed another wound in the same location of the 2nd metatarsal head protrusion.  The patient has noted drainage for several weeks on his sock upon admission.  He had been applying promogram to the area as he had promogram remaining at home.  Will add Mepilex Ag foam to the promogran.  Now has small new ulcer plantar aspect of the left 4th toe.      Description:Left plantar ulcer  Location: left foot 2nd metatarsal head   Duration:  January 2019   Context: Had cellulitis and in hospital Jan 2016.  Wound last healed September 2018  Modifying Factors: Diabetes and foot deformity as well as noncompliance                  See wound doc progress notes. Documents will be scanned.        Antonio Hidalgo Jr  [facility]

## 2019-03-14 ENCOUNTER — OFFICE VISIT (OUTPATIENT)
Dept: WOUND CARE | Facility: HOSPITAL | Age: 74
End: 2019-03-14
Attending: SURGERY
Payer: MEDICARE

## 2019-03-14 VITALS — SYSTOLIC BLOOD PRESSURE: 134 MMHG | DIASTOLIC BLOOD PRESSURE: 80 MMHG | RESPIRATION RATE: 19 BRPM | HEART RATE: 74 BPM

## 2019-03-14 DIAGNOSIS — E11.621 DIABETIC ULCER OF LEFT MIDFOOT ASSOCIATED WITH TYPE 2 DIABETES MELLITUS, WITH FAT LAYER EXPOSED: Primary | ICD-10-CM

## 2019-03-14 DIAGNOSIS — L97.422 DIABETIC ULCER OF LEFT MIDFOOT ASSOCIATED WITH TYPE 2 DIABETES MELLITUS, WITH FAT LAYER EXPOSED: Primary | ICD-10-CM

## 2019-03-14 PROCEDURE — 99499 NO LOS: ICD-10-PCS | Mod: ,,, | Performed by: SURGERY

## 2019-03-14 PROCEDURE — 99499 UNLISTED E&M SERVICE: CPT | Mod: ,,, | Performed by: SURGERY

## 2019-03-14 PROCEDURE — 11042 DBRDMT SUBQ TIS 1ST 20SQCM/<: CPT

## 2019-03-14 PROCEDURE — 27201912 HC WOUND CARE DEBRIDEMENT SUPPLIES

## 2019-03-14 NOTE — PROGRESS NOTES
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  Patient previously was discharged from the Wound Clinic in September of 2018.  At that time, he did have inserts made to relieve pressure.  The patient admits he has not been utilizing his inserts at all.  This is been ongoing for several months.  As of 2/14/2019, he has subsequently developed another wound in the same location of the 2nd metatarsal head protrusion.  The patient has noted drainage for several weeks on his sock upon admission.  He had been applying promogram to the area as he had promogram remaining at home.  Will add Mepilex Ag foam to the promogran.  Now has small new ulcer plantar aspect of the left 4th toe.      Description:Left plantar ulcer  Location: left foot 2nd metatarsal head   Duration:  January 2019   Context: Had cellulitis and in hospital Jan 2016.  Wound last healed September 2018  Modifying Factors: Diabetes and foot deformity as well as noncompliance                  See wound doc progress notes. Documents will be scanned.        Antonio Hidalgo Jr  [facility]

## 2019-03-21 ENCOUNTER — OFFICE VISIT (OUTPATIENT)
Dept: WOUND CARE | Facility: HOSPITAL | Age: 74
End: 2019-03-21
Attending: SURGERY
Payer: MEDICARE

## 2019-03-21 VITALS — DIASTOLIC BLOOD PRESSURE: 101 MMHG | SYSTOLIC BLOOD PRESSURE: 158 MMHG | RESPIRATION RATE: 18 BRPM | HEART RATE: 79 BPM

## 2019-03-21 DIAGNOSIS — L97.422 DIABETIC ULCER OF LEFT MIDFOOT ASSOCIATED WITH TYPE 2 DIABETES MELLITUS, WITH FAT LAYER EXPOSED: ICD-10-CM

## 2019-03-21 DIAGNOSIS — E11.621 DIABETIC ULCER OF LEFT MIDFOOT ASSOCIATED WITH TYPE 2 DIABETES MELLITUS, WITH FAT LAYER EXPOSED: ICD-10-CM

## 2019-03-21 PROCEDURE — 99499 UNLISTED E&M SERVICE: CPT | Mod: ,,, | Performed by: SURGERY

## 2019-03-21 PROCEDURE — 27201912 HC WOUND CARE DEBRIDEMENT SUPPLIES

## 2019-03-21 PROCEDURE — 11042 DBRDMT SUBQ TIS 1ST 20SQCM/<: CPT

## 2019-03-21 PROCEDURE — 99499 NO LOS: ICD-10-PCS | Mod: ,,, | Performed by: SURGERY

## 2019-03-21 NOTE — PROGRESS NOTES
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  Patient previously was discharged from the Wound Clinic in September of 2018.  At that time, he did have inserts made to relieve pressure.  The patient admits he has not been utilizing his inserts at all.  This is been ongoing for several months.  As of 2/14/2019, he has subsequently developed another wound in the same location of the 2nd metatarsal head protrusion.  The patient has noted drainage for several weeks on his sock upon admission.  He had been applying promogram to the area as he had promogram remaining at home.  Mepilex Ag foam to the promogran.        Description:Left plantar ulcer  Location: left foot 2nd metatarsal head   Duration:  January 2019   Context: Had cellulitis and in hospital Jan 2016.  Wound last healed September 2018  Modifying Factors: Diabetes and foot deformity as well as noncompliance                  See wound doc progress notes. Documents will be scanned.        Antonio Hidalgo Jr  [facility]

## 2019-03-28 ENCOUNTER — OFFICE VISIT (OUTPATIENT)
Dept: WOUND CARE | Facility: HOSPITAL | Age: 74
End: 2019-03-28
Attending: SURGERY
Payer: MEDICARE

## 2019-03-28 VITALS — RESPIRATION RATE: 20 BRPM | HEART RATE: 70 BPM | DIASTOLIC BLOOD PRESSURE: 86 MMHG | SYSTOLIC BLOOD PRESSURE: 136 MMHG

## 2019-03-28 DIAGNOSIS — E11.621 DIABETIC ULCER OF LEFT MIDFOOT ASSOCIATED WITH TYPE 2 DIABETES MELLITUS, WITH FAT LAYER EXPOSED: ICD-10-CM

## 2019-03-28 DIAGNOSIS — L97.422 DIABETIC ULCER OF LEFT MIDFOOT ASSOCIATED WITH TYPE 2 DIABETES MELLITUS, WITH FAT LAYER EXPOSED: ICD-10-CM

## 2019-03-28 PROCEDURE — 99499 NO LOS: ICD-10-PCS | Mod: ,,, | Performed by: SURGERY

## 2019-03-28 PROCEDURE — 11042 DBRDMT SUBQ TIS 1ST 20SQCM/<: CPT

## 2019-03-28 PROCEDURE — 99499 UNLISTED E&M SERVICE: CPT | Mod: ,,, | Performed by: SURGERY

## 2019-03-28 PROCEDURE — 97597 DBRDMT OPN WND 1ST 20 CM/<: CPT

## 2019-03-28 NOTE — PROGRESS NOTES
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  Patient previously was discharged from the Wound Clinic in September of 2018.  At that time, he did have inserts made to relieve pressure.  The patient admits he has not been utilizing his inserts at all.  This is been ongoing for several months.  As of 2/14/2019, he has subsequently developed another wound in the same location of the 2nd metatarsal head protrusion.  The patient has noted drainage for several weeks on his sock upon admission.  He had been applying promogram to the area as he had promogram remaining at home.  Mepilex Ag foam to the promogran.  Wound edges are definitely flatter and not as rolled.      Description:Left plantar ulcer  Location: left foot 2nd metatarsal head   Duration:  January 2019   Context: Had cellulitis and in hospital Jan 2016.  Wound last healed September 2018  Modifying Factors: Diabetes and foot deformity as well as noncompliance                  See wound doc progress notes. Documents will be scanned.        Antonio Hidalgo Jr  [facility]

## 2019-04-04 ENCOUNTER — OFFICE VISIT (OUTPATIENT)
Dept: WOUND CARE | Facility: HOSPITAL | Age: 74
End: 2019-04-04
Attending: SURGERY
Payer: MEDICARE

## 2019-04-04 DIAGNOSIS — E11.621 DIABETIC ULCER OF LEFT MIDFOOT ASSOCIATED WITH TYPE 2 DIABETES MELLITUS, WITH FAT LAYER EXPOSED: ICD-10-CM

## 2019-04-04 DIAGNOSIS — L97.422 DIABETIC ULCER OF LEFT MIDFOOT ASSOCIATED WITH TYPE 2 DIABETES MELLITUS, WITH FAT LAYER EXPOSED: ICD-10-CM

## 2019-04-04 PROCEDURE — 11042 DBRDMT SUBQ TIS 1ST 20SQCM/<: CPT

## 2019-04-04 PROCEDURE — 99499 NO LOS: ICD-10-PCS | Mod: ,,, | Performed by: SURGERY

## 2019-04-04 PROCEDURE — 27201912 HC WOUND CARE DEBRIDEMENT SUPPLIES

## 2019-04-04 PROCEDURE — 99499 UNLISTED E&M SERVICE: CPT | Mod: ,,, | Performed by: SURGERY

## 2019-04-04 NOTE — PROGRESS NOTES
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  Patient previously was discharged from the Wound Clinic in September of 2018.  At that time, he did have inserts made to relieve pressure.  The patient admits he has not been utilizing his inserts at all.  This is been ongoing for several months.  As of 2/14/2019, he has subsequently developed another wound in the same location of the 2nd metatarsal head protrusion.  The patient has noted drainage for several weeks on his sock upon admission.  He had been applying promogram to the area as he had promogram remaining at home.  Mepilex Ag foam to the promogran.  Wound edges are definitely flatter and not as rolled      Description:Left plantar ulcer  Location: left foot 2nd metatarsal head   Duration:  January 2019   Context: Had cellulitis and in hospital Jan 2016.  Wound last healed September 2018  Modifying Factors: Diabetes and foot deformity as well as noncompliance                  See wound doc progress notes. Documents will be scanned.        Antonio Hidalgo Jr  [facility]

## 2019-04-11 ENCOUNTER — OFFICE VISIT (OUTPATIENT)
Dept: WOUND CARE | Facility: HOSPITAL | Age: 74
End: 2019-04-11
Attending: SURGERY
Payer: MEDICARE

## 2019-04-11 VITALS — SYSTOLIC BLOOD PRESSURE: 134 MMHG | HEART RATE: 81 BPM | RESPIRATION RATE: 18 BRPM | DIASTOLIC BLOOD PRESSURE: 89 MMHG

## 2019-04-11 DIAGNOSIS — E11.621 DIABETIC ULCER OF LEFT MIDFOOT ASSOCIATED WITH TYPE 2 DIABETES MELLITUS, WITH FAT LAYER EXPOSED: ICD-10-CM

## 2019-04-11 DIAGNOSIS — L97.422 DIABETIC ULCER OF LEFT MIDFOOT ASSOCIATED WITH TYPE 2 DIABETES MELLITUS, WITH FAT LAYER EXPOSED: ICD-10-CM

## 2019-04-11 PROCEDURE — 99499 UNLISTED E&M SERVICE: CPT | Mod: ,,, | Performed by: SURGERY

## 2019-04-11 PROCEDURE — 99499 NO LOS: ICD-10-PCS | Mod: ,,, | Performed by: SURGERY

## 2019-04-11 PROCEDURE — 27201912 HC WOUND CARE DEBRIDEMENT SUPPLIES

## 2019-04-11 PROCEDURE — 11042 DBRDMT SUBQ TIS 1ST 20SQCM/<: CPT

## 2019-04-11 NOTE — PROGRESS NOTES
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  Patient previously was discharged from the Wound Clinic in September of 2018.  At that time, he did have inserts made to relieve pressure.  The patient admits he has not been utilizing his inserts at all.  This is been ongoing for several months.  As of 2/14/2019, he has subsequently developed another wound in the same location of the 2nd metatarsal head protrusion.  The patient has noted drainage for several weeks on his sock upon admission.  He had been applying promogram to the area as he had promogram remaining at home.  Mepilex Ag foam to the promogran.  Wound edges are definitely flatter and not as rolled.  Mandy wound callus.      Description:Left plantar ulcer  Location: left foot 2nd metatarsal head   Duration:  January 2019   Context: Had cellulitis and in hospital Jan 2016.  Wound last healed September 2018  Modifying Factors: Diabetes and foot deformity as well as noncompliance                  See wound doc progress notes. Documents will be scanned.        Antonio Hidalgo Jr  [facility]

## 2019-04-18 ENCOUNTER — OFFICE VISIT (OUTPATIENT)
Dept: WOUND CARE | Facility: HOSPITAL | Age: 74
End: 2019-04-18
Attending: SURGERY
Payer: MEDICARE

## 2019-04-18 VITALS — SYSTOLIC BLOOD PRESSURE: 138 MMHG | DIASTOLIC BLOOD PRESSURE: 94 MMHG | HEART RATE: 77 BPM

## 2019-04-18 DIAGNOSIS — E11.621 DIABETIC ULCER OF LEFT MIDFOOT ASSOCIATED WITH TYPE 2 DIABETES MELLITUS, WITH FAT LAYER EXPOSED: ICD-10-CM

## 2019-04-18 DIAGNOSIS — L97.422 DIABETIC ULCER OF LEFT MIDFOOT ASSOCIATED WITH TYPE 2 DIABETES MELLITUS, WITH FAT LAYER EXPOSED: ICD-10-CM

## 2019-04-18 PROCEDURE — 27201912 HC WOUND CARE DEBRIDEMENT SUPPLIES

## 2019-04-18 PROCEDURE — 99499 NO LOS: ICD-10-PCS | Mod: ,,, | Performed by: SURGERY

## 2019-04-18 PROCEDURE — 99499 UNLISTED E&M SERVICE: CPT | Mod: ,,, | Performed by: SURGERY

## 2019-04-18 PROCEDURE — 11042 DBRDMT SUBQ TIS 1ST 20SQCM/<: CPT

## 2019-04-18 NOTE — PROGRESS NOTES
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  Patient previously was discharged from the Wound Clinic in September of 2018.  At that time, he did have inserts made to relieve pressure.  The patient admits he has not been utilizing his inserts at all.  This is been ongoing for several months.  As of 2/14/2019, he has subsequently developed another wound in the same location of the 2nd metatarsal head protrusion.  The patient has noted drainage for several weeks on his sock upon admission.  He had been applying promogram to the area as he had promogram remaining at home.  Mepilex Ag foam to the promogran.  Wound is deeper today.  Mandy wound macerated.  Wife states patient has been doing more and is on his feet more with his gardening.  Stressed the importance of offloading.      Description:Left plantar ulcer  Location: left foot 2nd metatarsal head   Duration:  January 2019   Context: Had cellulitis and in hospital Jan 2016.  Wound last healed September 2018  Modifying Factors: Diabetes and foot deformity as well as noncompliance                  See wound doc progress notes. Documents will be scanned.        Antonio Hidalgo Jr  [facility]

## 2019-04-25 ENCOUNTER — OFFICE VISIT (OUTPATIENT)
Dept: WOUND CARE | Facility: HOSPITAL | Age: 74
End: 2019-04-25
Attending: SURGERY
Payer: MEDICARE

## 2019-04-25 VITALS
RESPIRATION RATE: 20 BRPM | TEMPERATURE: 98 F | DIASTOLIC BLOOD PRESSURE: 81 MMHG | SYSTOLIC BLOOD PRESSURE: 140 MMHG | HEART RATE: 79 BPM

## 2019-04-25 DIAGNOSIS — L97.422 DIABETIC ULCER OF LEFT MIDFOOT ASSOCIATED WITH TYPE 2 DIABETES MELLITUS, WITH FAT LAYER EXPOSED: ICD-10-CM

## 2019-04-25 DIAGNOSIS — L97.522: ICD-10-CM

## 2019-04-25 DIAGNOSIS — E11.621 DIABETIC ULCER OF LEFT MIDFOOT ASSOCIATED WITH TYPE 2 DIABETES MELLITUS, WITH FAT LAYER EXPOSED: ICD-10-CM

## 2019-04-25 PROCEDURE — 11042 DBRDMT SUBQ TIS 1ST 20SQCM/<: CPT

## 2019-04-25 PROCEDURE — 27201912 HC WOUND CARE DEBRIDEMENT SUPPLIES

## 2019-04-25 PROCEDURE — 99499 UNLISTED E&M SERVICE: CPT | Mod: ,,, | Performed by: SURGERY

## 2019-04-25 PROCEDURE — 99499 NO LOS: ICD-10-PCS | Mod: ,,, | Performed by: SURGERY

## 2019-04-25 NOTE — PROGRESS NOTES
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  Patient previously was discharged from the Wound Clinic in September of 2018.  At that time, he did have inserts made to relieve pressure.  The patient admits he has not been utilizing his inserts at all.  This is been ongoing for several months.  As of 2/14/2019, he has subsequently developed another wound in the same location of the 2nd metatarsal head protrusion.  The patient has noted drainage for several weeks on his sock upon admission.  He had been applying promogram to the area as he had promogram remaining at home.  Mepilex Ag foam to the promogran.  Wound is smaller today.  Mandy wound less macerated.  Stressed the importance of offloading.      Description:Left plantar ulcer  Location: left foot 2nd metatarsal head   Duration:  January 2019   Context: Had cellulitis and in hospital Jan 2016.  Wound last healed September 2018  Modifying Factors: Diabetes and foot deformity as well as noncompliance                  See wound doc progress notes. Documents will be scanned.        Antonio Hidalgo Jr  [facility]

## 2019-05-02 ENCOUNTER — OFFICE VISIT (OUTPATIENT)
Dept: WOUND CARE | Facility: HOSPITAL | Age: 74
End: 2019-05-02
Attending: SURGERY
Payer: MEDICARE

## 2019-05-02 VITALS — SYSTOLIC BLOOD PRESSURE: 112 MMHG | DIASTOLIC BLOOD PRESSURE: 76 MMHG | RESPIRATION RATE: 18 BRPM | HEART RATE: 80 BPM

## 2019-05-02 DIAGNOSIS — E11.621 DIABETIC ULCER OF LEFT MIDFOOT ASSOCIATED WITH TYPE 2 DIABETES MELLITUS, WITH FAT LAYER EXPOSED: ICD-10-CM

## 2019-05-02 DIAGNOSIS — L97.422 DIABETIC ULCER OF LEFT MIDFOOT ASSOCIATED WITH TYPE 2 DIABETES MELLITUS, WITH FAT LAYER EXPOSED: ICD-10-CM

## 2019-05-02 PROCEDURE — 99499 UNLISTED E&M SERVICE: CPT | Mod: ,,, | Performed by: SURGERY

## 2019-05-02 PROCEDURE — 11042 DBRDMT SUBQ TIS 1ST 20SQCM/<: CPT

## 2019-05-02 PROCEDURE — 27201912 HC WOUND CARE DEBRIDEMENT SUPPLIES

## 2019-05-02 PROCEDURE — 99499 NO LOS: ICD-10-PCS | Mod: ,,, | Performed by: SURGERY

## 2019-05-02 NOTE — PROGRESS NOTES
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  Patient previously was discharged from the Wound Clinic in September of 2018.  At that time, he did have inserts made to relieve pressure.  The patient admits he has not been utilizing his inserts at all.  This is been ongoing for several months.  As of 2/14/2019, he has subsequently developed another wound in the same location of the 2nd metatarsal head protrusion.  The patient has noted drainage for several weeks on his sock upon admission.  He had been applying promogram to the area as he had promogram remaining at home.  Mepilex Ag foam to the promogran.  Wound is smaller today.  Mandy wound less macerated.  Stressed the importance of offloading      Description:Left plantar ulcer  Location: left foot 2nd metatarsal head   Duration:  January 2019   Context: Had cellulitis and in hospital Jan 2016.  Wound last healed September 2018  Modifying Factors: Diabetes and foot deformity as well as noncompliance                  See wound doc progress notes. Documents will be scanned.        Antonio Hidalgo Jr  [facility]

## 2019-05-09 ENCOUNTER — OFFICE VISIT (OUTPATIENT)
Dept: WOUND CARE | Facility: HOSPITAL | Age: 74
End: 2019-05-09
Attending: SURGERY
Payer: MEDICARE

## 2019-05-09 VITALS — SYSTOLIC BLOOD PRESSURE: 139 MMHG | HEART RATE: 62 BPM | DIASTOLIC BLOOD PRESSURE: 85 MMHG | RESPIRATION RATE: 18 BRPM

## 2019-05-09 DIAGNOSIS — E11.621 DIABETIC ULCER OF LEFT MIDFOOT ASSOCIATED WITH TYPE 2 DIABETES MELLITUS, WITH FAT LAYER EXPOSED: ICD-10-CM

## 2019-05-09 DIAGNOSIS — L97.422 DIABETIC ULCER OF LEFT MIDFOOT ASSOCIATED WITH TYPE 2 DIABETES MELLITUS, WITH FAT LAYER EXPOSED: ICD-10-CM

## 2019-05-09 PROCEDURE — 99499 UNLISTED E&M SERVICE: CPT | Mod: ,,, | Performed by: SURGERY

## 2019-05-09 PROCEDURE — 99499 NO LOS: ICD-10-PCS | Mod: ,,, | Performed by: SURGERY

## 2019-05-09 PROCEDURE — 11042 DBRDMT SUBQ TIS 1ST 20SQCM/<: CPT

## 2019-05-09 PROCEDURE — 27201912 HC WOUND CARE DEBRIDEMENT SUPPLIES

## 2019-05-09 NOTE — PROGRESS NOTES
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  Patient previously was discharged from the Wound Clinic in September of 2018.  At that time, he did have inserts made to relieve pressure.  The patient admits he has not been utilizing his inserts at all.  This is been ongoing for several months.  As of 2/14/2019, he has subsequently developed another wound in the same location of the 2nd metatarsal head protrusion.  The patient has noted drainage for several weeks on his sock upon admission.  He had been applying promogram to the area as he had promogram remaining at home.  Mepilex Ag foam to the promogran.  Wound is about the same today.  Mandy wound less macerated.  Stressed the importance of offloading      Description:Left plantar ulcer  Location: left foot 2nd metatarsal head   Duration:  January 2019   Context: Had cellulitis and in hospital Jan 2016.  Wound last healed September 2018  Modifying Factors: Diabetes and foot deformity as well as noncompliance                  See wound doc progress notes. Documents will be scanned.        Antonio Hidalgo Jr  [facility]

## 2019-05-16 ENCOUNTER — OFFICE VISIT (OUTPATIENT)
Dept: WOUND CARE | Facility: HOSPITAL | Age: 74
End: 2019-05-16
Attending: SURGERY
Payer: MEDICARE

## 2019-05-16 VITALS — SYSTOLIC BLOOD PRESSURE: 134 MMHG | HEART RATE: 66 BPM | DIASTOLIC BLOOD PRESSURE: 86 MMHG

## 2019-05-16 DIAGNOSIS — L97.422 DIABETIC ULCER OF LEFT MIDFOOT ASSOCIATED WITH TYPE 2 DIABETES MELLITUS, WITH FAT LAYER EXPOSED: ICD-10-CM

## 2019-05-16 DIAGNOSIS — E11.621 DIABETIC ULCER OF LEFT MIDFOOT ASSOCIATED WITH TYPE 2 DIABETES MELLITUS, WITH FAT LAYER EXPOSED: ICD-10-CM

## 2019-05-16 PROCEDURE — 99499 UNLISTED E&M SERVICE: CPT | Mod: ,,, | Performed by: SURGERY

## 2019-05-16 PROCEDURE — 11042 DBRDMT SUBQ TIS 1ST 20SQCM/<: CPT

## 2019-05-16 PROCEDURE — 99499 NO LOS: ICD-10-PCS | Mod: ,,, | Performed by: SURGERY

## 2019-05-16 PROCEDURE — 27201912 HC WOUND CARE DEBRIDEMENT SUPPLIES

## 2019-05-16 NOTE — PROGRESS NOTES
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  Patient previously was discharged from the Wound Clinic in September of 2018.  At that time, he did have inserts made to relieve pressure.  The patient admits he has not been utilizing his inserts at all.  This is been ongoing for several months.  As of 2/14/2019, he has subsequently developed another wound in the same location of the 2nd metatarsal head protrusion.  The patient has noted drainage for several weeks on his sock upon admission.  He had been applying promogram to the area as he had promogram remaining at home.  Mepilex Ag foam to the promogran.  Wound is about the same today.  Hillary wound more macerated.  Gentian violet 2 hillary wound. Stressed the importance of offloading      Description:Left plantar ulcer  Location: left foot 2nd metatarsal head   Duration:  January 2019   Context: Had cellulitis and in hospital Jan 2016.  Wound last healed September 2018  Modifying Factors: Diabetes and foot deformity as well as noncompliance                  See wound doc progress notes. Documents will be scanned.        Antonio Hidalgo Jr  [facility]

## 2019-05-23 ENCOUNTER — OFFICE VISIT (OUTPATIENT)
Dept: WOUND CARE | Facility: HOSPITAL | Age: 74
End: 2019-05-23
Attending: SURGERY
Payer: MEDICARE

## 2019-05-23 VITALS — DIASTOLIC BLOOD PRESSURE: 109 MMHG | SYSTOLIC BLOOD PRESSURE: 179 MMHG | RESPIRATION RATE: 18 BRPM | HEART RATE: 66 BPM

## 2019-05-23 DIAGNOSIS — L97.422 DIABETIC ULCER OF LEFT MIDFOOT ASSOCIATED WITH TYPE 2 DIABETES MELLITUS, WITH FAT LAYER EXPOSED: Primary | ICD-10-CM

## 2019-05-23 DIAGNOSIS — E11.610 CHARCOT'S JOINT ARTHROPATHY IN TYPE 2 DIABETES MELLITUS: ICD-10-CM

## 2019-05-23 DIAGNOSIS — E11.621 DIABETIC ULCER OF LEFT MIDFOOT ASSOCIATED WITH TYPE 2 DIABETES MELLITUS, WITH FAT LAYER EXPOSED: Primary | ICD-10-CM

## 2019-05-23 PROCEDURE — 99499 UNLISTED E&M SERVICE: CPT | Mod: ,,, | Performed by: SURGERY

## 2019-05-23 PROCEDURE — 99499 NO LOS: ICD-10-PCS | Mod: ,,, | Performed by: SURGERY

## 2019-05-23 PROCEDURE — 11042 DBRDMT SUBQ TIS 1ST 20SQCM/<: CPT

## 2019-05-23 PROCEDURE — 27201912 HC WOUND CARE DEBRIDEMENT SUPPLIES

## 2019-05-23 NOTE — PROGRESS NOTES
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  Patient previously was discharged from the [facility] in September of 2018.  At that time, he did have inserts made to relieve pressure.  The patient admits he has not been utilizing his inserts at all.  This is been ongoing for several months.  As of 2/14/2019, he has subsequently developed another wound in the same location of the 2nd metatarsal head protrusion.  The patient has noted drainage for several weeks on his sock upon admission.  He had been applying promogram to the area as he had promogram remaining at home.  Mepilex Ag foam to the promogran.  Wound is about the same today.  Hillary wound more macerated.  Gentian violet 2 hillary wound. Stressed the importance of offloading      Description:Left plantar ulcer  Location: left foot 2nd metatarsal head   Duration:  January 2019   Context: Had cellulitis and in hospital Jan 2016.  Wound last healed September 2018  Modifying Factors: Diabetes and foot deformity as well as noncompliance            Charcot's joint arthropathy in type 2 diabetes mellitus    Overview     Patient has long-time foot deformity on the left. He has plantar protrusion of the left 2nd metatarsal head.  This has made him prone to ulceration in this region.  On 02/14/2019, I have recommended to the patient either long-term use of offloading diabetic shoes with inserts or surgery to correct the foot deformity by a podiatrist.  He would like to utilize diabetic shoes.  He has been instructed to obtain orders for diabetic shoes from his primary care physician   Patient seen on skin care rounds after wound care referral received for assessment of skin impairment and recommendations of topical management. Chart reviewed: Serum HgbA1C 8.4%, Tin 19, BMI 28.4kg/m2, patient interviewed: reports using depends for urinary leakage when out in public and uses lynn pad when in home setting. Uses walker for ambulation with lower leg edema, otherwise does not need ambulation assistance. Patient H/O ductal carcinoma in situ of left breast, anemia, CKD, CHF, MI, CAD, atrial fibrillation, HTN, DM. Admitted with CHF exacerbation. Luciana.         Current Assessment & Plan     Wound slow to progress. Not offloading. Given rx for orthotics. Will try regranex.                See wound doc progress notes. Documents will be scanned.        Gonzalez Alvarado  [facility]

## 2019-05-30 ENCOUNTER — OFFICE VISIT (OUTPATIENT)
Dept: WOUND CARE | Facility: HOSPITAL | Age: 74
End: 2019-05-30
Attending: SURGERY
Payer: MEDICARE

## 2019-05-30 VITALS — SYSTOLIC BLOOD PRESSURE: 169 MMHG | DIASTOLIC BLOOD PRESSURE: 108 MMHG | HEART RATE: 65 BPM

## 2019-05-30 DIAGNOSIS — L97.422 DIABETIC ULCER OF LEFT MIDFOOT ASSOCIATED WITH TYPE 2 DIABETES MELLITUS, WITH FAT LAYER EXPOSED: Primary | ICD-10-CM

## 2019-05-30 DIAGNOSIS — E11.621 DIABETIC ULCER OF LEFT MIDFOOT ASSOCIATED WITH TYPE 2 DIABETES MELLITUS, WITH FAT LAYER EXPOSED: Primary | ICD-10-CM

## 2019-05-30 PROCEDURE — 27201912 HC WOUND CARE DEBRIDEMENT SUPPLIES

## 2019-05-30 PROCEDURE — 99499 NO LOS: ICD-10-PCS | Mod: ,,, | Performed by: SURGERY

## 2019-05-30 PROCEDURE — 11042 DBRDMT SUBQ TIS 1ST 20SQCM/<: CPT

## 2019-05-30 PROCEDURE — 99499 UNLISTED E&M SERVICE: CPT | Mod: ,,, | Performed by: SURGERY

## 2019-05-30 NOTE — PROGRESS NOTES
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  Patient previously was discharged from the Wound Clinic in September of 2018.  At that time, he did have inserts made to relieve pressure.  The patient admits he has not been utilizing his inserts at all.  This is been ongoing for several months.  As of 2/14/2019, he has subsequently developed another wound in the same location of the 2nd metatarsal head protrusion.  The patient has noted drainage for several weeks on his sock upon admission.  He had been applying promogram to the area as he had promogram remaining at home.  Mepilex Ag foam to the promogran.  Wound is about the same today.  Hillary wound more macerated.  Gentian violet 2 hillary wound. Stressed the importance of offloading      Description:Left plantar ulcer  Location: left foot 2nd metatarsal head   Duration:  January 2019   Context: Had cellulitis and in hospital Jan 2016.  Wound last healed September 2018  Modifying Factors: Diabetes and foot deformity as well as noncompliance            Current Assessment & Plan     Much improved as patient has offloaded. Getting orthotics.               See wound doc progress notes. Documents will be scanned.        Gonzalez Alvarado  [facility]

## 2019-06-06 ENCOUNTER — OFFICE VISIT (OUTPATIENT)
Dept: WOUND CARE | Facility: HOSPITAL | Age: 74
End: 2019-06-06
Attending: SURGERY
Payer: MEDICARE

## 2019-06-06 VITALS — RESPIRATION RATE: 18 BRPM | DIASTOLIC BLOOD PRESSURE: 92 MMHG | HEART RATE: 88 BPM | SYSTOLIC BLOOD PRESSURE: 158 MMHG

## 2019-06-06 DIAGNOSIS — E11.621 DIABETIC ULCER OF LEFT MIDFOOT ASSOCIATED WITH TYPE 2 DIABETES MELLITUS, WITH FAT LAYER EXPOSED: ICD-10-CM

## 2019-06-06 DIAGNOSIS — L97.422 DIABETIC ULCER OF LEFT MIDFOOT ASSOCIATED WITH TYPE 2 DIABETES MELLITUS, WITH FAT LAYER EXPOSED: ICD-10-CM

## 2019-06-06 PROCEDURE — 11042 DBRDMT SUBQ TIS 1ST 20SQCM/<: CPT

## 2019-06-06 PROCEDURE — 99499 UNLISTED E&M SERVICE: CPT | Mod: ,,, | Performed by: SURGERY

## 2019-06-06 PROCEDURE — 99499 NO LOS: ICD-10-PCS | Mod: ,,, | Performed by: SURGERY

## 2019-06-06 PROCEDURE — 27201912 HC WOUND CARE DEBRIDEMENT SUPPLIES

## 2019-06-06 NOTE — PROGRESS NOTES
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  Patient previously was discharged from the Wound Clinic in September of 2018.  At that time, he did have inserts made to relieve pressure.  The patient admits he has not been utilizing his inserts at all.  This is been ongoing for several months.  As of 2/14/2019, he has subsequently developed another wound in the same location of the 2nd metatarsal head protrusion.  The patient has noted drainage for several weeks on his sock upon admission.  He had been applying promogram to the area as he had promogram remaining at home.  Mepilex Ag foam to the promogran.  Wound is about the same today.  Gentian violet to hillary wound. Stressed the importance of offloading.      Description:Left plantar ulcer  Location: left foot 2nd metatarsal head   Duration:  January 2019   Context: Had cellulitis and in hospital Jan 2016.  Wound last healed September 2018  Modifying Factors: Diabetes and foot deformity as well as noncompliance                  See wound doc progress notes. Documents will be scanned.        Antonio Hidalgo Jr  [facility]

## 2019-06-13 ENCOUNTER — OFFICE VISIT (OUTPATIENT)
Dept: WOUND CARE | Facility: HOSPITAL | Age: 74
End: 2019-06-13
Attending: SURGERY
Payer: MEDICARE

## 2019-06-13 VITALS — SYSTOLIC BLOOD PRESSURE: 133 MMHG | DIASTOLIC BLOOD PRESSURE: 86 MMHG | HEART RATE: 64 BPM

## 2019-06-13 DIAGNOSIS — E11.621 DIABETIC ULCER OF LEFT MIDFOOT ASSOCIATED WITH TYPE 2 DIABETES MELLITUS, WITH FAT LAYER EXPOSED: ICD-10-CM

## 2019-06-13 DIAGNOSIS — L97.422 DIABETIC ULCER OF LEFT MIDFOOT ASSOCIATED WITH TYPE 2 DIABETES MELLITUS, WITH FAT LAYER EXPOSED: ICD-10-CM

## 2019-06-13 PROCEDURE — 11042 DBRDMT SUBQ TIS 1ST 20SQCM/<: CPT

## 2019-06-13 PROCEDURE — 27201912 HC WOUND CARE DEBRIDEMENT SUPPLIES

## 2019-06-13 PROCEDURE — 99499 UNLISTED E&M SERVICE: CPT | Mod: ,,, | Performed by: SURGERY

## 2019-06-13 PROCEDURE — 99499 NO LOS: ICD-10-PCS | Mod: ,,, | Performed by: SURGERY

## 2019-06-13 NOTE — PROGRESS NOTES
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  Patient previously was discharged from the Wound Clinic in September of 2018.  At that time, he did have inserts made to relieve pressure.  The patient admits he has not been utilizing his inserts at all.  This is been ongoing for several months.  As of 2/14/2019, he has subsequently developed another wound in the same location of the 2nd metatarsal head protrusion.  The patient has noted drainage for several weeks on his sock upon admission.  He had been applying promogram to the area as he had promogram remaining at home.  Mepilex Ag foam to the promogran.  Wound is about the same today.  Gentian violet to hillary wound. Stressed the importance of offloading.      Description:Left plantar ulcer  Location: left foot 2nd metatarsal head   Duration:  January 2019   Context: Had cellulitis and in hospital Jan 2016.  Wound last healed September 2018  Modifying Factors: Diabetes and foot deformity as well as noncompliance            Current Assessment & Plan     Wound much shallower. Cont. Present tx. Patient to get orthotic visit on Monday.                See wound doc progress notes. Documents will be scanned.        Gonzalez Alvarado  [facility]

## 2019-06-20 ENCOUNTER — OFFICE VISIT (OUTPATIENT)
Dept: WOUND CARE | Facility: HOSPITAL | Age: 74
End: 2019-06-20
Attending: SURGERY
Payer: MEDICARE

## 2019-06-20 VITALS — HEART RATE: 69 BPM | RESPIRATION RATE: 18 BRPM | SYSTOLIC BLOOD PRESSURE: 155 MMHG | DIASTOLIC BLOOD PRESSURE: 102 MMHG

## 2019-06-20 DIAGNOSIS — E11.621 DIABETIC ULCER OF LEFT MIDFOOT ASSOCIATED WITH TYPE 2 DIABETES MELLITUS, WITH FAT LAYER EXPOSED: Primary | ICD-10-CM

## 2019-06-20 DIAGNOSIS — L97.422 DIABETIC ULCER OF LEFT MIDFOOT ASSOCIATED WITH TYPE 2 DIABETES MELLITUS, WITH FAT LAYER EXPOSED: Primary | ICD-10-CM

## 2019-06-20 PROCEDURE — 27201912 HC WOUND CARE DEBRIDEMENT SUPPLIES

## 2019-06-20 PROCEDURE — 11042 DBRDMT SUBQ TIS 1ST 20SQCM/<: CPT

## 2019-06-20 PROCEDURE — 99499 UNLISTED E&M SERVICE: CPT | Mod: ,,, | Performed by: SURGERY

## 2019-06-20 PROCEDURE — 99499 NO LOS: ICD-10-PCS | Mod: ,,, | Performed by: SURGERY

## 2019-06-27 ENCOUNTER — OFFICE VISIT (OUTPATIENT)
Dept: WOUND CARE | Facility: HOSPITAL | Age: 74
End: 2019-06-27
Attending: SURGERY
Payer: MEDICARE

## 2019-06-27 VITALS — DIASTOLIC BLOOD PRESSURE: 90 MMHG | HEART RATE: 66 BPM | RESPIRATION RATE: 18 BRPM | SYSTOLIC BLOOD PRESSURE: 150 MMHG

## 2019-06-27 DIAGNOSIS — E11.621 DIABETIC ULCER OF LEFT MIDFOOT ASSOCIATED WITH TYPE 2 DIABETES MELLITUS, WITH FAT LAYER EXPOSED: Primary | ICD-10-CM

## 2019-06-27 DIAGNOSIS — L97.422 DIABETIC ULCER OF LEFT MIDFOOT ASSOCIATED WITH TYPE 2 DIABETES MELLITUS, WITH FAT LAYER EXPOSED: Primary | ICD-10-CM

## 2019-06-27 PROCEDURE — 99499 NO LOS: ICD-10-PCS | Mod: ,,, | Performed by: SURGERY

## 2019-06-27 PROCEDURE — 27201912 HC WOUND CARE DEBRIDEMENT SUPPLIES

## 2019-06-27 PROCEDURE — 11042 DBRDMT SUBQ TIS 1ST 20SQCM/<: CPT

## 2019-06-27 PROCEDURE — 99499 UNLISTED E&M SERVICE: CPT | Mod: ,,, | Performed by: SURGERY

## 2019-06-27 NOTE — PROGRESS NOTES
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  Patient previously was discharged from the Wound Clinic in September of 2018.  At that time, he did have inserts made to relieve pressure.  The patient admits he has not been utilizing his inserts at all.  This is been ongoing for several months.  As of 2/14/2019, he has subsequently developed another wound in the same location of the 2nd metatarsal head protrusion.  The patient has noted drainage for several weeks on his sock upon admission.  He had been applying promogram to the area as he had promogram remaining at home.  Mepilex Ag foam to the promogran.  Wound is about the same today.  Gentian violet to hillary wound. Stressed the importance of offloading.      Description:Left plantar ulcer  Location: left foot 2nd metatarsal head   Duration:  January 2019   Context: Had cellulitis and in hospital Jan 2016.  Wound last healed September 2018  Modifying Factors: Diabetes and foot deformity as well as noncompliance            Current Assessment & Plan     Less callous and wound about same. still waiting for shoe.               See wound doc progress notes. Documents will be scanned.        Gonzalez Alvarado  [facility]

## 2019-07-11 ENCOUNTER — OFFICE VISIT (OUTPATIENT)
Dept: WOUND CARE | Facility: HOSPITAL | Age: 74
End: 2019-07-11
Attending: SURGERY
Payer: MEDICARE

## 2019-07-11 VITALS — HEART RATE: 86 BPM | DIASTOLIC BLOOD PRESSURE: 81 MMHG | RESPIRATION RATE: 20 BRPM | SYSTOLIC BLOOD PRESSURE: 165 MMHG

## 2019-07-11 DIAGNOSIS — E11.621 DIABETIC ULCER OF LEFT MIDFOOT ASSOCIATED WITH TYPE 2 DIABETES MELLITUS, WITH FAT LAYER EXPOSED: Primary | ICD-10-CM

## 2019-07-11 DIAGNOSIS — L97.422 DIABETIC ULCER OF LEFT MIDFOOT ASSOCIATED WITH TYPE 2 DIABETES MELLITUS, WITH FAT LAYER EXPOSED: Primary | ICD-10-CM

## 2019-07-11 PROCEDURE — 99499 UNLISTED E&M SERVICE: CPT | Mod: ,,, | Performed by: SURGERY

## 2019-07-11 PROCEDURE — 99499 NO LOS: ICD-10-PCS | Mod: ,,, | Performed by: SURGERY

## 2019-07-11 PROCEDURE — 11042 DBRDMT SUBQ TIS 1ST 20SQCM/<: CPT

## 2019-07-11 PROCEDURE — 27201912 HC WOUND CARE DEBRIDEMENT SUPPLIES

## 2019-07-11 NOTE — ASSESSMENT & PLAN NOTE
The wound is larger today.  There is some soft callus.  Patient was more active and is not yet have   His prescription shoes.

## 2019-07-11 NOTE — PROGRESS NOTES
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  Patient previously was discharged from the Wound Clinic in September of 2018.  At that time, he did have inserts made to relieve pressure.  The patient admits he has not been utilizing his inserts at all.  This is been ongoing for several months.  As of 2/14/2019, he has subsequently developed another wound in the same location of the 2nd metatarsal head protrusion.  The patient has noted drainage for several weeks on his sock upon admission.  He had been applying promogram to the area as he had promogram remaining at home.  Mepilex Ag foam to the promogran.  Wound is about the same today.  Gentian violet to hillary wound. Stressed the importance of offloading.      Description:Left plantar ulcer  Location: left foot 2nd metatarsal head   Duration:  January 2019   Context: Had cellulitis and in hospital Jan 2016.  Wound last healed September 2018  Modifying Factors: Diabetes and foot deformity as well as noncompliance            Current Assessment & Plan     The wound is larger today.  There is some soft callus.  Patient was more active and is not yet have   His prescription shoes.               See wound doc progress notes. Documents will be scanned.        Gonzalez Alvarado  [facility]

## 2019-07-18 ENCOUNTER — OFFICE VISIT (OUTPATIENT)
Dept: WOUND CARE | Facility: HOSPITAL | Age: 74
End: 2019-07-18
Attending: SURGERY
Payer: MEDICARE

## 2019-07-18 VITALS — DIASTOLIC BLOOD PRESSURE: 87 MMHG | HEART RATE: 69 BPM | SYSTOLIC BLOOD PRESSURE: 146 MMHG

## 2019-07-18 DIAGNOSIS — E11.621 DIABETIC ULCER OF LEFT MIDFOOT ASSOCIATED WITH TYPE 2 DIABETES MELLITUS, WITH FAT LAYER EXPOSED: Primary | ICD-10-CM

## 2019-07-18 DIAGNOSIS — L97.422 DIABETIC ULCER OF LEFT MIDFOOT ASSOCIATED WITH TYPE 2 DIABETES MELLITUS, WITH FAT LAYER EXPOSED: Primary | ICD-10-CM

## 2019-07-18 PROCEDURE — 27201912 HC WOUND CARE DEBRIDEMENT SUPPLIES

## 2019-07-18 PROCEDURE — 11042 DBRDMT SUBQ TIS 1ST 20SQCM/<: CPT

## 2019-07-18 PROCEDURE — 99499 NO LOS: ICD-10-PCS | Mod: ,,, | Performed by: SURGERY

## 2019-07-18 PROCEDURE — 99499 UNLISTED E&M SERVICE: CPT | Mod: ,,, | Performed by: SURGERY

## 2019-07-18 NOTE — PROGRESS NOTES
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  Patient previously was discharged from the Wound Clinic in September of 2018.  At that time, he did have inserts made to relieve pressure.  The patient admits he has not been utilizing his inserts at all.  This is been ongoing for several months.  As of 2/14/2019, he has subsequently developed another wound in the same location of the 2nd metatarsal head protrusion.  The patient has noted drainage for several weeks on his sock upon admission.  He had been applying promogram to the area as he had promogram remaining at home.  Mepilex Ag foam to the promogran.  Wound is about the same today.  Gentian violet to hillary wound. Stressed the importance of offloading.      Description:Left plantar ulcer  Location: left foot 2nd metatarsal head   Duration:  January 2019   Context: Had cellulitis and in hospital Jan 2016.  Wound last healed September 2018  Modifying Factors: Diabetes and foot deformity as well as noncompliance            Current Assessment & Plan     Finally received tissues Monday.  Wound flatter but about the same size so depth has improved.  Hopefully offloading with a new shoe will get him to heal.               See wound doc progress notes. Documents will be scanned.        Gonzalez Alvarado  [facility]

## 2019-07-18 NOTE — ASSESSMENT & PLAN NOTE
Finally received tissues Monday.  Wound flatter but about the same size so depth has improved.  Hopefully offloading with a new shoe will get him to heal.

## 2019-07-25 ENCOUNTER — OFFICE VISIT (OUTPATIENT)
Dept: WOUND CARE | Facility: HOSPITAL | Age: 74
End: 2019-07-25
Attending: SURGERY
Payer: MEDICARE

## 2019-07-25 VITALS
TEMPERATURE: 98 F | RESPIRATION RATE: 20 BRPM | DIASTOLIC BLOOD PRESSURE: 83 MMHG | SYSTOLIC BLOOD PRESSURE: 167 MMHG | HEART RATE: 66 BPM

## 2019-07-25 DIAGNOSIS — E11.621 DIABETIC ULCER OF LEFT MIDFOOT ASSOCIATED WITH TYPE 2 DIABETES MELLITUS, WITH FAT LAYER EXPOSED: Primary | ICD-10-CM

## 2019-07-25 DIAGNOSIS — L97.422 DIABETIC ULCER OF LEFT MIDFOOT ASSOCIATED WITH TYPE 2 DIABETES MELLITUS, WITH FAT LAYER EXPOSED: Primary | ICD-10-CM

## 2019-07-25 PROCEDURE — 99499 NO LOS: ICD-10-PCS | Mod: ,,, | Performed by: SURGERY

## 2019-07-25 PROCEDURE — 11042 DBRDMT SUBQ TIS 1ST 20SQCM/<: CPT

## 2019-07-25 PROCEDURE — 99499 UNLISTED E&M SERVICE: CPT | Mod: ,,, | Performed by: SURGERY

## 2019-07-25 PROCEDURE — 27201912 HC WOUND CARE DEBRIDEMENT SUPPLIES

## 2019-07-25 NOTE — PROGRESS NOTES
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  Patient previously was discharged from the Wound Clinic in September of 2018.  At that time, he did have inserts made to relieve pressure.  The patient admits he has not been utilizing his inserts at all.  This is been ongoing for several months.  As of 2/14/2019, he has subsequently developed another wound in the same location of the 2nd metatarsal head protrusion.  The patient has noted drainage for several weeks on his sock upon admission.  He had been applying promogram to the area as he had promogram remaining at home.  Mepilex Ag foam to the promogran.  Wound is about the same today.  Gentian violet to hillary wound. Stressed the importance of offloading.      Description:Left plantar ulcer  Location: left foot 2nd metatarsal head   Duration:  January 2019   Context: Had cellulitis and in hospital Jan 2016.  Wound last healed September 2018  Modifying Factors: Diabetes and foot deformity as well as noncompliance                  See wound doc progress notes. Documents will be scanned.        Antonio Hidalgo Jr  [facility]

## 2019-08-08 ENCOUNTER — OFFICE VISIT (OUTPATIENT)
Dept: WOUND CARE | Facility: HOSPITAL | Age: 74
End: 2019-08-08
Attending: SURGERY
Payer: MEDICARE

## 2019-08-08 VITALS — HEART RATE: 60 BPM | SYSTOLIC BLOOD PRESSURE: 157 MMHG | DIASTOLIC BLOOD PRESSURE: 92 MMHG

## 2019-08-08 DIAGNOSIS — E11.621 DIABETIC ULCER OF LEFT MIDFOOT ASSOCIATED WITH TYPE 2 DIABETES MELLITUS, WITH FAT LAYER EXPOSED: ICD-10-CM

## 2019-08-08 DIAGNOSIS — L97.422 DIABETIC ULCER OF LEFT MIDFOOT ASSOCIATED WITH TYPE 2 DIABETES MELLITUS, WITH FAT LAYER EXPOSED: ICD-10-CM

## 2019-08-08 PROCEDURE — 99499 UNLISTED E&M SERVICE: CPT | Mod: ,,, | Performed by: SURGERY

## 2019-08-08 PROCEDURE — 27201912 HC WOUND CARE DEBRIDEMENT SUPPLIES

## 2019-08-08 PROCEDURE — 11042 DBRDMT SUBQ TIS 1ST 20SQCM/<: CPT

## 2019-08-08 PROCEDURE — 99499 NO LOS: ICD-10-PCS | Mod: ,,, | Performed by: SURGERY

## 2019-08-08 NOTE — PROGRESS NOTES
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  Patient previously was discharged from the Wound Clinic in September of 2018.  At that time, he did have inserts made to relieve pressure.  The patient admits he has not been utilizing his inserts at all.  This is been ongoing for several months.  As of 2/14/2019, he has subsequently developed another wound in the same location of the 2nd metatarsal head protrusion.  The patient has noted drainage for several weeks on his sock upon admission.  He had been applying promogram to the area as he had promogram remaining at home.  Mepilex Ag foam to the promogran.  Wound is about the same today.  Gentian violet to hillary wound. Stressed the importance of offloading.      Description:Left plantar ulcer  Location: left foot 2nd metatarsal head   Duration:  January 2019   Context: Had cellulitis and in hospital Jan 2016.  Wound last healed September 2018  Modifying Factors: Diabetes and foot deformity as well as noncompliance.                  See wound doc progress notes. Documents will be scanned.        Antonio Hidalgo Jr  [facility]

## 2019-08-15 ENCOUNTER — OFFICE VISIT (OUTPATIENT)
Dept: WOUND CARE | Facility: HOSPITAL | Age: 74
End: 2019-08-15
Attending: SURGERY
Payer: MEDICARE

## 2019-08-15 VITALS — RESPIRATION RATE: 18 BRPM | HEART RATE: 93 BPM | SYSTOLIC BLOOD PRESSURE: 148 MMHG | DIASTOLIC BLOOD PRESSURE: 91 MMHG

## 2019-08-15 DIAGNOSIS — L97.422 DIABETIC ULCER OF LEFT MIDFOOT ASSOCIATED WITH TYPE 2 DIABETES MELLITUS, WITH FAT LAYER EXPOSED: Primary | ICD-10-CM

## 2019-08-15 DIAGNOSIS — E11.610 CHARCOT'S JOINT ARTHROPATHY IN TYPE 2 DIABETES MELLITUS: ICD-10-CM

## 2019-08-15 DIAGNOSIS — E11.621 DIABETIC ULCER OF LEFT MIDFOOT ASSOCIATED WITH TYPE 2 DIABETES MELLITUS, WITH FAT LAYER EXPOSED: Primary | ICD-10-CM

## 2019-08-15 PROCEDURE — 99499 NO LOS: ICD-10-PCS | Mod: ,,, | Performed by: SURGERY

## 2019-08-15 PROCEDURE — 27201912 HC WOUND CARE DEBRIDEMENT SUPPLIES

## 2019-08-15 PROCEDURE — 99499 UNLISTED E&M SERVICE: CPT | Mod: ,,, | Performed by: SURGERY

## 2019-08-15 PROCEDURE — 11042 DBRDMT SUBQ TIS 1ST 20SQCM/<: CPT

## 2019-08-15 NOTE — PROGRESS NOTES
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  Patient previously was discharged from the Wound Clinic in September of 2018.  At that time, he did have inserts made to relieve pressure.  The patient admits he has not been utilizing his inserts at all.  This is been ongoing for several months.  As of 2/14/2019, he has subsequently developed another wound in the same location of the 2nd metatarsal head protrusion.  The patient has noted drainage for several weeks on his sock upon admission.  He had been applying promogram to the area as he had promogram remaining at home.  Mepilex Ag foam to the promogran.  Wound is about the same today.  Gentian violet to hillary wound. Stressed the importance of offloading.      Description:Left plantar ulcer  Location: left foot 2nd metatarsal head   Duration:  January 2019   Context: Had cellulitis and in hospital Jan 2016.  Wound last healed September 2018  Modifying Factors: Diabetes and foot deformity as well as noncompliance.            Current Assessment & Plan     The wound is much shallower.  Will continue with the same wound care.  Patient has finally received   his orthotics.         Charcot's joint arthropathy in type 2 diabetes mellitus    Overview     Patient has long-time foot deformity on the left. He has plantar protrusion of the left 2nd metatarsal head.  This has made him prone to ulceration in this region.  On 02/14/2019, I have recommended to the patient either long-term use of offloading diabetic shoes with inserts or surgery to correct the foot deformity by a podiatrist.  He would like to  utilize diabetic shoes.  He has been instructed to obtain orders for diabetic shoes from his primary care physician Dr. Chowdhury.               See wound doc progress notes. Documents will be scanned.        Gonzalez BRADLEY Alvarado  [facility]

## 2019-08-15 NOTE — ASSESSMENT & PLAN NOTE
The wound is much shallower.  Will continue with the same wound care.  Patient has finally received   his orthotics.

## 2019-08-22 ENCOUNTER — OFFICE VISIT (OUTPATIENT)
Dept: WOUND CARE | Facility: HOSPITAL | Age: 74
End: 2019-08-22
Attending: SURGERY
Payer: MEDICARE

## 2019-08-22 DIAGNOSIS — E11.621 DIABETIC ULCER OF LEFT MIDFOOT ASSOCIATED WITH TYPE 2 DIABETES MELLITUS, WITH FAT LAYER EXPOSED: ICD-10-CM

## 2019-08-22 DIAGNOSIS — L97.422 DIABETIC ULCER OF LEFT MIDFOOT ASSOCIATED WITH TYPE 2 DIABETES MELLITUS, WITH FAT LAYER EXPOSED: ICD-10-CM

## 2019-08-22 PROCEDURE — 11042 DBRDMT SUBQ TIS 1ST 20SQCM/<: CPT

## 2019-08-22 PROCEDURE — 99499 UNLISTED E&M SERVICE: CPT | Mod: ,,, | Performed by: SURGERY

## 2019-08-22 PROCEDURE — 99499 NO LOS: ICD-10-PCS | Mod: ,,, | Performed by: SURGERY

## 2019-08-22 PROCEDURE — 27201912 HC WOUND CARE DEBRIDEMENT SUPPLIES

## 2019-08-22 NOTE — PROGRESS NOTES
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  Patient previously was discharged from the Wound Clinic in September of 2018.  At that time, he did have inserts made to relieve pressure.  The patient admits he has not been utilizing his inserts at all.  This is been ongoing for several months.  As of 2/14/2019, he has subsequently developed another wound in the same location of the 2nd metatarsal head protrusion. The patient did have open heart surgery and when he was off if his feet this wound actually healed.  However, he is fairly active.  Stressed the importance of offloading.  Continue silver alginate.      Description:Left plantar ulcer  Location: left foot 2nd metatarsal head   Duration:  January 2019   Context: Had cellulitis and in hospital Jan 2016.  Wound last healed September 2018  Modifying Factors: Diabetes and foot deformity as well as noncompliance.                  See wound doc progress notes. Documents will be scanned.        Antonio Hidalgo Jr  [facility]

## 2019-09-05 ENCOUNTER — OFFICE VISIT (OUTPATIENT)
Dept: WOUND CARE | Facility: HOSPITAL | Age: 74
End: 2019-09-05
Attending: SURGERY
Payer: MEDICARE

## 2019-09-05 VITALS
TEMPERATURE: 98 F | SYSTOLIC BLOOD PRESSURE: 153 MMHG | HEART RATE: 69 BPM | DIASTOLIC BLOOD PRESSURE: 96 MMHG | RESPIRATION RATE: 18 BRPM

## 2019-09-05 DIAGNOSIS — L97.422 DIABETIC ULCER OF LEFT MIDFOOT ASSOCIATED WITH TYPE 2 DIABETES MELLITUS, WITH FAT LAYER EXPOSED: Primary | ICD-10-CM

## 2019-09-05 DIAGNOSIS — E11.610 CHARCOT'S JOINT ARTHROPATHY IN TYPE 2 DIABETES MELLITUS: ICD-10-CM

## 2019-09-05 DIAGNOSIS — E11.621 DIABETIC ULCER OF LEFT MIDFOOT ASSOCIATED WITH TYPE 2 DIABETES MELLITUS, WITH FAT LAYER EXPOSED: Primary | ICD-10-CM

## 2019-09-05 PROCEDURE — 11042 DBRDMT SUBQ TIS 1ST 20SQCM/<: CPT

## 2019-09-05 PROCEDURE — 99499 UNLISTED E&M SERVICE: CPT | Mod: ,,, | Performed by: SURGERY

## 2019-09-05 PROCEDURE — 27201912 HC WOUND CARE DEBRIDEMENT SUPPLIES

## 2019-09-05 PROCEDURE — 99499 NO LOS: ICD-10-PCS | Mod: ,,, | Performed by: SURGERY

## 2019-09-05 NOTE — PROGRESS NOTES
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  Patient previously was discharged from the [facility] in September of 2018.  At that time, he did have inserts made to relieve pressure.  The patient admits he has not been utilizing his inserts at all.  This is been ongoing for several months.  As of 2/14/2019, he has subsequently developed another wound in the same location of the 2nd metatarsal head protrusion. The patient did have open heart surgery and when he was off if his feet this wound actually healed.  However, he is fairly active.  Stressed the importance of offloading.  Continue silver alginate.      Description:Left plantar ulcer  Location: left foot 2nd metatarsal head   Duration:  January 2019   Context: Had cellulitis and in hospital Jan 2016.  Wound last healed September 2018  Modifying Factors: Diabetes and foot deformity as well as noncompliance.            Charcot's joint arthropathy in type 2 diabetes mellitus    Overview     Patient has long-time foot deformity on the left. He has plantar protrusion of the left 2nd metatarsal head.  This has made him prone to ulceration in this region.  On 02/14/2019, I have recommended to the patient either long-term use of offloading diabetic shoes with inserts or surgery to correct the foot deformity by a podiatrist.  He would like to utilize diabetic shoes.  He has been instructed to obtain orders for diabetic shoes from his primary care physician Dr. Chowdhury.         Current Assessment & Plan     Wound much shallower. Continue with present care plan.               See wound doc  progress notes. Documents will be scanned.        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  Patient previously was discharged from the Wound Clinic in September of 2018.  At that time, he did have inserts made to relieve pressure.  The patient admits he has not been utilizing his inserts at all.  This is been ongoing for several months.  As of 2/14/2019, he has subsequently developed another wound in the same location of the 2nd metatarsal head protrusion. The patient did have open heart surgery and when he was off if his feet this wound actually healed.  However, he is fairly active.  Stressed the importance of offloading.  Continue silver alginate.      Description:Left plantar ulcer  Location: left foot 2nd metatarsal head   Duration:  January 2019   Context: Had cellulitis and in hospital Jan 2016.  Wound last healed September 2018  Modifying Factors: Diabetes and foot deformity as well as noncompliance.            Charcot's joint arthropathy in type 2 diabetes mellitus    Overview     Patient has long-time foot deformity on the left. He has plantar protrusion of the left 2nd metatarsal head.  This has made him prone to ulceration in this region.  On 02/14/2019, I have recommended to the patient either long-term use of offloading diabetic shoes with inserts or surgery to correct the foot deformity by a podiatrist.  He would like to utilize diabetic shoes.  He has been instructed to obtain orders for diabetic shoes from his primary care physician Dr. Chowdhury.         Current Assessment  & Plan     Wound much shallower. Continue with present care plan.               See wound doc progress notes. Documents will be scanned.        Gonzalez BRADLEY Landry [facility]

## 2019-09-19 ENCOUNTER — OFFICE VISIT (OUTPATIENT)
Dept: WOUND CARE | Facility: HOSPITAL | Age: 74
End: 2019-09-19
Attending: SURGERY
Payer: MEDICARE

## 2019-09-19 VITALS — SYSTOLIC BLOOD PRESSURE: 164 MMHG | HEART RATE: 67 BPM | TEMPERATURE: 97 F | DIASTOLIC BLOOD PRESSURE: 94 MMHG

## 2019-09-19 DIAGNOSIS — E11.621 DIABETIC ULCER OF LEFT MIDFOOT ASSOCIATED WITH TYPE 2 DIABETES MELLITUS, WITH FAT LAYER EXPOSED: Primary | ICD-10-CM

## 2019-09-19 DIAGNOSIS — L97.422 DIABETIC ULCER OF LEFT MIDFOOT ASSOCIATED WITH TYPE 2 DIABETES MELLITUS, WITH FAT LAYER EXPOSED: Primary | ICD-10-CM

## 2019-09-19 PROCEDURE — 11042 DBRDMT SUBQ TIS 1ST 20SQCM/<: CPT

## 2019-09-19 PROCEDURE — 27201912 HC WOUND CARE DEBRIDEMENT SUPPLIES

## 2019-09-19 PROCEDURE — 99499 UNLISTED E&M SERVICE: CPT | Mod: ,,, | Performed by: SURGERY

## 2019-09-19 PROCEDURE — 99499 NO LOS: ICD-10-PCS | Mod: ,,, | Performed by: SURGERY

## 2019-10-03 ENCOUNTER — OFFICE VISIT (OUTPATIENT)
Dept: WOUND CARE | Facility: HOSPITAL | Age: 74
End: 2019-10-03
Attending: SURGERY
Payer: MEDICARE

## 2019-10-03 VITALS
TEMPERATURE: 98 F | DIASTOLIC BLOOD PRESSURE: 95 MMHG | HEART RATE: 66 BPM | SYSTOLIC BLOOD PRESSURE: 158 MMHG | RESPIRATION RATE: 18 BRPM

## 2019-10-03 DIAGNOSIS — E11.621 DIABETIC ULCER OF LEFT MIDFOOT ASSOCIATED WITH TYPE 2 DIABETES MELLITUS, WITH FAT LAYER EXPOSED: ICD-10-CM

## 2019-10-03 DIAGNOSIS — L97.422 DIABETIC ULCER OF LEFT MIDFOOT ASSOCIATED WITH TYPE 2 DIABETES MELLITUS, WITH FAT LAYER EXPOSED: ICD-10-CM

## 2019-10-03 DIAGNOSIS — E11.610 CHARCOT'S JOINT ARTHROPATHY IN TYPE 2 DIABETES MELLITUS: Primary | ICD-10-CM

## 2019-10-03 PROCEDURE — 11042 DBRDMT SUBQ TIS 1ST 20SQCM/<: CPT

## 2019-10-03 PROCEDURE — 27201912 HC WOUND CARE DEBRIDEMENT SUPPLIES

## 2019-10-03 PROCEDURE — 99499 UNLISTED E&M SERVICE: CPT | Mod: ,,, | Performed by: SURGERY

## 2019-10-03 PROCEDURE — 99499 NO LOS: ICD-10-PCS | Mod: ,,, | Performed by: SURGERY

## 2019-10-03 NOTE — ASSESSMENT & PLAN NOTE
The wound is about the same.  Patient has a good bit of callus buildup compared to last visit.  Callus was removed with a scalpel.   The wound base was debrided with curette.

## 2019-10-03 NOTE — PROGRESS NOTES
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  Patient previously was discharged from the Wound Clinic in September of 2018.  At that time, he did have inserts made to relieve pressure.  The patient admits he has not been utilizing his inserts at all.  This is been ongoing for several months.  As of 2/14/2019, he has subsequently developed another wound in the same location of the 2nd metatarsal head protrusion. The patient did have open heart surgery and when he was off if his feet this wound actually healed.  However, he is fairly active.  Stressed the importance of offloading.  Continue silver alginate.      Description:Left plantar ulcer  Location: left foot 2nd metatarsal head   Duration:  January 2019   Context: Had cellulitis and in hospital Jan 2016.  Wound last healed September 2018  Modifying Factors: Diabetes and foot deformity as well as noncompliance.            Current Assessment & Plan     The wound is about the same.  Patient has a good bit of callus buildup compared to last visit.  Callus was removed with a scalpel.   The wound base was debrided with curette.         Charcot's joint arthropathy in type 2 diabetes mellitus - Primary    Overview     Patient has long-time foot deformity on the left. He has plantar protrusion of the left 2nd metatarsal head.  This has made him prone to ulceration in this region.  On 02/14/2019, I have recommended to the patient either long-term use of offloading diabetic shoes with inserts or surgery to correct the foot deformity by a podiatrist.  He would like to utilize diabetic shoes.  He has been  instructed to obtain orders for diabetic shoes from his primary care physician Dr. Chowdhury.               See wound doc progress notes. Documents will be scanned.        Gonzalez BRADLEY Landry [facility]

## 2019-10-17 ENCOUNTER — OFFICE VISIT (OUTPATIENT)
Dept: WOUND CARE | Facility: HOSPITAL | Age: 74
End: 2019-10-17
Attending: SURGERY
Payer: MEDICARE

## 2019-10-17 VITALS
HEART RATE: 64 BPM | TEMPERATURE: 98 F | RESPIRATION RATE: 18 BRPM | SYSTOLIC BLOOD PRESSURE: 165 MMHG | DIASTOLIC BLOOD PRESSURE: 100 MMHG

## 2019-10-17 DIAGNOSIS — L97.422 DIABETIC ULCER OF LEFT MIDFOOT ASSOCIATED WITH TYPE 2 DIABETES MELLITUS, WITH FAT LAYER EXPOSED: ICD-10-CM

## 2019-10-17 DIAGNOSIS — E11.621 DIABETIC ULCER OF LEFT MIDFOOT ASSOCIATED WITH TYPE 2 DIABETES MELLITUS, WITH FAT LAYER EXPOSED: ICD-10-CM

## 2019-10-17 PROCEDURE — 27201912 HC WOUND CARE DEBRIDEMENT SUPPLIES

## 2019-10-17 PROCEDURE — 11042 DBRDMT SUBQ TIS 1ST 20SQCM/<: CPT

## 2019-10-17 PROCEDURE — 99499 UNLISTED E&M SERVICE: CPT | Mod: ,,, | Performed by: SURGERY

## 2019-10-17 PROCEDURE — 99499 NO LOS: ICD-10-PCS | Mod: ,,, | Performed by: SURGERY

## 2019-10-17 NOTE — PROGRESS NOTES
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  Patient previously was discharged from the Wound Clinic in September of 2018.  At that time, he did have inserts made to relieve pressure.  The patient admits he has not been utilizing his inserts at all.  This is been ongoing for several months.  As of 2/14/2019, he has subsequently developed another wound in the same location of the 2nd metatarsal head protrusion. The patient did have open heart surgery and when he was off if his feet this wound actually healed.  However, he is fairly active.  Stressed the importance of offloading.  Continue collagen with Mepilex Ag.      Description:Left plantar ulcer  Location: left foot 2nd metatarsal head   Duration:  January 2019   Context: Had cellulitis and in hospital Jan 2016.  Wound last healed September 2018  Modifying Factors: Diabetes and foot deformity as well as noncompliance.                  See wound doc progress notes. Documents will be scanned.        Antonio Hidalgo Jr  [facility]

## 2019-10-31 ENCOUNTER — OFFICE VISIT (OUTPATIENT)
Dept: WOUND CARE | Facility: HOSPITAL | Age: 74
End: 2019-10-31
Attending: SURGERY
Payer: MEDICARE

## 2019-10-31 VITALS
RESPIRATION RATE: 20 BRPM | DIASTOLIC BLOOD PRESSURE: 102 MMHG | SYSTOLIC BLOOD PRESSURE: 165 MMHG | TEMPERATURE: 99 F | HEART RATE: 80 BPM

## 2019-10-31 DIAGNOSIS — L97.422 DIABETIC ULCER OF LEFT MIDFOOT ASSOCIATED WITH TYPE 2 DIABETES MELLITUS, WITH FAT LAYER EXPOSED: ICD-10-CM

## 2019-10-31 DIAGNOSIS — E11.621 DIABETIC ULCER OF LEFT MIDFOOT ASSOCIATED WITH TYPE 2 DIABETES MELLITUS, WITH FAT LAYER EXPOSED: ICD-10-CM

## 2019-10-31 PROCEDURE — 99499 UNLISTED E&M SERVICE: CPT | Mod: ,,, | Performed by: SURGERY

## 2019-10-31 PROCEDURE — 11042 DBRDMT SUBQ TIS 1ST 20SQCM/<: CPT

## 2019-10-31 PROCEDURE — 99499 NO LOS: ICD-10-PCS | Mod: ,,, | Performed by: SURGERY

## 2019-10-31 PROCEDURE — 27201912 HC WOUND CARE DEBRIDEMENT SUPPLIES

## 2019-10-31 NOTE — PROGRESS NOTES
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  Patient previously was discharged from the Wound Clinic in September of 2018.  At that time, he did have inserts made to relieve pressure.  The patient admits he has not been utilizing his inserts at all.  This is been ongoing for several months.  As of 2/14/2019, he has subsequently developed another wound in the same location of the 2nd metatarsal head protrusion. The patient did have open heart surgery and when he was off if his feet this wound actually healed.  However, he is fairly active.  Stressed the importance of offloading.  Continue collagen with Mepilex Ag.  Wound not as deep today.      Description:Left plantar ulcer  Location: left foot 2nd metatarsal head   Duration:  January 2019   Context: Had cellulitis and in hospital Jan 2016.  Wound last healed September 2018  Modifying Factors: Diabetes and foot deformity as well as noncompliance.                  See wound doc progress notes. Documents will be scanned.        Antonio Hidalgo Jr  [facility]

## 2019-11-14 ENCOUNTER — OFFICE VISIT (OUTPATIENT)
Dept: WOUND CARE | Facility: HOSPITAL | Age: 74
End: 2019-11-14
Attending: SURGERY
Payer: MEDICARE

## 2019-11-14 VITALS
TEMPERATURE: 98 F | SYSTOLIC BLOOD PRESSURE: 144 MMHG | HEART RATE: 79 BPM | RESPIRATION RATE: 20 BRPM | DIASTOLIC BLOOD PRESSURE: 68 MMHG

## 2019-11-14 DIAGNOSIS — E11.621 DIABETIC ULCER OF LEFT MIDFOOT ASSOCIATED WITH TYPE 2 DIABETES MELLITUS, WITH FAT LAYER EXPOSED: ICD-10-CM

## 2019-11-14 DIAGNOSIS — L97.422 DIABETIC ULCER OF LEFT MIDFOOT ASSOCIATED WITH TYPE 2 DIABETES MELLITUS, WITH FAT LAYER EXPOSED: ICD-10-CM

## 2019-11-14 PROCEDURE — 99499 UNLISTED E&M SERVICE: CPT | Mod: ,,, | Performed by: SURGERY

## 2019-11-14 PROCEDURE — 11042 DBRDMT SUBQ TIS 1ST 20SQCM/<: CPT

## 2019-11-14 PROCEDURE — 27201912 HC WOUND CARE DEBRIDEMENT SUPPLIES

## 2019-11-14 PROCEDURE — 99499 NO LOS: ICD-10-PCS | Mod: ,,, | Performed by: SURGERY

## 2019-11-14 NOTE — PROGRESS NOTES
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  Patient previously was discharged from the Wound Clinic in September of 2018.  At that time, he did have inserts made to relieve pressure.  The patient admits he has not been utilizing his inserts at all.  This is been ongoing for several months.  As of 2/14/2019, he has subsequently developed another wound in the same location of the 2nd metatarsal head protrusion. The patient did have open heart surgery and when he was off if his feet this wound actually healed.  However, he is fairly active.  Stressed the importance of offloading.  Continue collagen with Mepilex Ag.  Wound not as deep today      Description:Left plantar ulcer  Location: left foot 2nd metatarsal head   Duration:  January 2019   Context: Had cellulitis and in hospital Jan 2016.  Wound last healed September 2018  Modifying Factors: Diabetes and foot deformity as well as noncompliance.                  See wound doc progress notes. Documents will be scanned.        Antonio Hidalgo Jr  [facility]

## 2019-12-05 ENCOUNTER — OFFICE VISIT (OUTPATIENT)
Dept: WOUND CARE | Facility: HOSPITAL | Age: 74
End: 2019-12-05
Attending: SURGERY
Payer: MEDICARE

## 2019-12-05 VITALS — DIASTOLIC BLOOD PRESSURE: 97 MMHG | HEART RATE: 66 BPM | SYSTOLIC BLOOD PRESSURE: 157 MMHG

## 2019-12-05 DIAGNOSIS — L97.422 CHRONIC ULCER OF LEFT MIDFOOT WITH FAT LAYER EXPOSED: ICD-10-CM

## 2019-12-05 DIAGNOSIS — L97.422 DIABETIC ULCER OF LEFT MIDFOOT ASSOCIATED WITH TYPE 2 DIABETES MELLITUS, WITH FAT LAYER EXPOSED: ICD-10-CM

## 2019-12-05 DIAGNOSIS — E11.621 DIABETIC ULCER OF LEFT MIDFOOT ASSOCIATED WITH TYPE 2 DIABETES MELLITUS, WITH FAT LAYER EXPOSED: ICD-10-CM

## 2019-12-05 DIAGNOSIS — E11.610 CHARCOT'S JOINT ARTHROPATHY IN TYPE 2 DIABETES MELLITUS: Primary | ICD-10-CM

## 2019-12-05 PROCEDURE — 99499 UNLISTED E&M SERVICE: CPT | Mod: ,,, | Performed by: SURGERY

## 2019-12-05 PROCEDURE — 27201912 HC WOUND CARE DEBRIDEMENT SUPPLIES

## 2019-12-05 PROCEDURE — 11042 DBRDMT SUBQ TIS 1ST 20SQCM/<: CPT

## 2019-12-05 PROCEDURE — 99499 NO LOS: ICD-10-PCS | Mod: ,,, | Performed by: SURGERY

## 2019-12-05 NOTE — ASSESSMENT & PLAN NOTE
The wound has a large amount of callus on the surrounding edges.  This is removed with a curette as well as the base of the wound  was debrided with curette.  We will try to add an aperture pad to offload.

## 2019-12-05 NOTE — PROGRESS NOTES
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  Patient previously was discharged from the Wound Clinic in September of 2018.  At that time, he did have inserts made to relieve pressure.  The patient admits he has not been utilizing his inserts at all.  This is been ongoing for several months.  As of 2/14/2019, he has subsequently developed another wound in the same location of the 2nd metatarsal head protrusion. The patient did have open heart surgery and when he was off if his feet this wound actually healed.  However, he is fairly active.  Stressed the importance of offloading.  Continue collagen with Mepilex Ag.  Wound not as deep today      Description:Left plantar ulcer  Location: left foot 2nd metatarsal head   Duration:  January 2019   Context: Had cellulitis and in hospital Jan 2016.  Wound last healed September 2018  Modifying Factors: Diabetes and foot deformity as well as noncompliance.            Current Assessment & Plan     The wound has a large amount of callus on the surrounding edges.  This is removed with a curette as well as the base of the wound  was debrided with curette.  We will try to add an aperture pad to offload.         Chronic ulcer of left midfoot with fat layer exposed    Charcot's joint arthropathy in type 2 diabetes mellitus - Primary    Overview     Patient has long-time foot deformity on the left. He has plantar protrusion of the left 2nd metatarsal head.  This has made him prone to ulceration in this region.  On 02/14/2019, I have recommended to the patient either long-term use of offloading diabetic shoes with  inserts or surgery to correct the foot deformity by a podiatrist.  He would like to utilize diabetic shoes.  He has been instructed to obtain orders for diabetic shoes from his primary care physician Dr. Chowdhury.               See wound doc progress notes. Documents will be scanned.        Gonzalez BRADLEY Alvarado  [facility]

## 2019-12-19 ENCOUNTER — OFFICE VISIT (OUTPATIENT)
Dept: WOUND CARE | Facility: HOSPITAL | Age: 74
End: 2019-12-19
Attending: SURGERY
Payer: MEDICARE

## 2019-12-19 VITALS
RESPIRATION RATE: 20 BRPM | HEART RATE: 72 BPM | SYSTOLIC BLOOD PRESSURE: 157 MMHG | TEMPERATURE: 97 F | DIASTOLIC BLOOD PRESSURE: 94 MMHG

## 2019-12-19 DIAGNOSIS — L97.422 DIABETIC ULCER OF LEFT MIDFOOT ASSOCIATED WITH TYPE 2 DIABETES MELLITUS, WITH FAT LAYER EXPOSED: ICD-10-CM

## 2019-12-19 DIAGNOSIS — E11.621 DIABETIC ULCER OF LEFT MIDFOOT ASSOCIATED WITH TYPE 2 DIABETES MELLITUS, WITH FAT LAYER EXPOSED: ICD-10-CM

## 2019-12-19 PROCEDURE — 27201912 HC WOUND CARE DEBRIDEMENT SUPPLIES

## 2019-12-19 PROCEDURE — 99499 NO LOS: ICD-10-PCS | Mod: ,,, | Performed by: SURGERY

## 2019-12-19 PROCEDURE — 99499 UNLISTED E&M SERVICE: CPT | Mod: ,,, | Performed by: SURGERY

## 2019-12-19 PROCEDURE — 11042 DBRDMT SUBQ TIS 1ST 20SQCM/<: CPT

## 2019-12-19 NOTE — PROGRESS NOTES
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  Patient previously was discharged from the Wound Clinic in September of 2018.  At that time, he did have inserts made to relieve pressure.  The patient admits he has not been utilizing his inserts at all.  This is been ongoing for several months.  As of 2/14/2019, he has subsequently developed another wound in the same location of the 2nd metatarsal head protrusion. The patient did have open heart surgery and when he was off if his feet this wound actually healed.  However, he is fairly active.  Stressed the importance of offloading.  Continue collagen with Mepilex Ag.  Wound not as deep today.  There is periwound callus      Description:Left plantar ulcer  Location: left foot 2nd metatarsal head   Duration:  January 2019   Context: Had cellulitis and in hospital Jan 2016.  Wound last healed September 2018  Modifying Factors: Diabetes and foot deformity as well as noncompliance.                  See wound doc progress notes. Documents will be scanned.        Antonio Hidalgo Jr  [facility]

## 2020-01-02 ENCOUNTER — OFFICE VISIT (OUTPATIENT)
Dept: WOUND CARE | Facility: HOSPITAL | Age: 75
End: 2020-01-02
Attending: SURGERY
Payer: MEDICARE

## 2020-01-02 VITALS — HEART RATE: 64 BPM | DIASTOLIC BLOOD PRESSURE: 98 MMHG | SYSTOLIC BLOOD PRESSURE: 162 MMHG | TEMPERATURE: 98 F

## 2020-01-02 DIAGNOSIS — E11.621 DIABETIC ULCER OF LEFT MIDFOOT ASSOCIATED WITH TYPE 2 DIABETES MELLITUS, WITH FAT LAYER EXPOSED: Primary | ICD-10-CM

## 2020-01-02 DIAGNOSIS — E11.610 CHARCOT'S JOINT ARTHROPATHY IN TYPE 2 DIABETES MELLITUS: ICD-10-CM

## 2020-01-02 DIAGNOSIS — L97.422 DIABETIC ULCER OF LEFT MIDFOOT ASSOCIATED WITH TYPE 2 DIABETES MELLITUS, WITH FAT LAYER EXPOSED: Primary | ICD-10-CM

## 2020-01-02 PROCEDURE — 99499 UNLISTED E&M SERVICE: CPT | Mod: ,,, | Performed by: SURGERY

## 2020-01-02 PROCEDURE — 99499 NO LOS: ICD-10-PCS | Mod: ,,, | Performed by: SURGERY

## 2020-01-02 PROCEDURE — 97597 DBRDMT OPN WND 1ST 20 CM/<: CPT

## 2020-01-02 NOTE — PROGRESS NOTES
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  Patient previously was discharged from the Wound Clinic in September of 2018.  At that time, he did have inserts made to relieve pressure.  The patient admits he has not been utilizing his inserts at all.  This is been ongoing for several months.  As of 2/14/2019, he has subsequently developed another wound in the same location of the 2nd metatarsal head protrusion. The patient did have open heart surgery and when he was off if his feet this wound actually healed.  However, he is fairly active.  Stressed the importance of offloading.  Continue collagen with Mepilex Ag.  Wound not as deep today.  There is periwound callus      Description:Left plantar ulcer  Location: left foot 2nd metatarsal head   Duration:  January 2019   Context: Had cellulitis and in hospital Jan 2016.  Wound last healed September 2018  Modifying Factors: Diabetes and foot deformity as well as noncompliance.            Charcot's joint arthropathy in type 2 diabetes mellitus    Overview     Patient has long-time foot deformity on the left. He has plantar protrusion of the left 2nd metatarsal head.  This has made him prone to ulceration in this region.  On 02/14/2019, I have recommended to the patient either long-term use of offloading diabetic shoes with inserts or surgery to correct the foot deformity by a podiatrist.  He would like to utilize diabetic shoes.  He has been instructed to obtain orders for diabetic shoes from his primary care physician Dr. Chowdhury.         Current Assessment & Plan     The wound is much  improved in size and depth.  We will continue with offloading and wound care.               See wound doc progress notes. Documents will be scanned.        Gonzalez BRADLEY Alvarado  [facility]

## 2020-01-16 ENCOUNTER — OFFICE VISIT (OUTPATIENT)
Dept: WOUND CARE | Facility: HOSPITAL | Age: 75
End: 2020-01-16
Attending: SURGERY
Payer: MEDICARE

## 2020-01-16 VITALS
HEART RATE: 74 BPM | SYSTOLIC BLOOD PRESSURE: 162 MMHG | TEMPERATURE: 98 F | RESPIRATION RATE: 20 BRPM | DIASTOLIC BLOOD PRESSURE: 95 MMHG

## 2020-01-16 DIAGNOSIS — E11.621 DIABETIC ULCER OF LEFT MIDFOOT ASSOCIATED WITH TYPE 2 DIABETES MELLITUS, WITH FAT LAYER EXPOSED: ICD-10-CM

## 2020-01-16 DIAGNOSIS — L97.422 DIABETIC ULCER OF LEFT MIDFOOT ASSOCIATED WITH TYPE 2 DIABETES MELLITUS, WITH FAT LAYER EXPOSED: ICD-10-CM

## 2020-01-16 PROCEDURE — 99499 UNLISTED E&M SERVICE: CPT | Mod: ,,, | Performed by: SURGERY

## 2020-01-16 PROCEDURE — 11042 DBRDMT SUBQ TIS 1ST 20SQCM/<: CPT

## 2020-01-16 PROCEDURE — 27201912 HC WOUND CARE DEBRIDEMENT SUPPLIES

## 2020-01-16 PROCEDURE — 99499 NO LOS: ICD-10-PCS | Mod: ,,, | Performed by: SURGERY

## 2020-01-16 NOTE — PROGRESS NOTES
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  Patient previously was discharged from the Wound Clinic in September of 2018.  At that time, he did have inserts made to relieve pressure.  The patient admits he has not been utilizing his inserts at all.  This is been ongoing for several months.  As of 2/14/2019, he has subsequently developed another wound in the same location of the 2nd metatarsal head protrusion. The patient did have open heart surgery and when he was off if his feet this wound actually healed.  However, he is fairly active.  Stressed the importance of offloading.  Continue collagen with Mepilex Ag.  Wound not as deep today.  There is periwound callus      Description:Left plantar ulcer  Location: left foot 2nd metatarsal head   Duration:  January 2019   Context: Had cellulitis and in hospital Jan 2016.  Wound last healed September 2018  Modifying Factors: Diabetes and foot deformity as well as noncompliance                  See wound doc progress notes. Documents will be scanned.        Antonio Hidalgo Jr  [facility]

## 2020-01-30 ENCOUNTER — OFFICE VISIT (OUTPATIENT)
Dept: WOUND CARE | Facility: HOSPITAL | Age: 75
End: 2020-01-30
Attending: SURGERY
Payer: MEDICARE

## 2020-01-30 VITALS
RESPIRATION RATE: 18 BRPM | SYSTOLIC BLOOD PRESSURE: 156 MMHG | DIASTOLIC BLOOD PRESSURE: 88 MMHG | TEMPERATURE: 98 F | HEART RATE: 78 BPM

## 2020-01-30 DIAGNOSIS — L97.422 DIABETIC ULCER OF LEFT MIDFOOT ASSOCIATED WITH TYPE 2 DIABETES MELLITUS, WITH FAT LAYER EXPOSED: ICD-10-CM

## 2020-01-30 DIAGNOSIS — E11.621 DIABETIC ULCER OF LEFT MIDFOOT ASSOCIATED WITH TYPE 2 DIABETES MELLITUS, WITH FAT LAYER EXPOSED: ICD-10-CM

## 2020-01-30 PROCEDURE — 11042 DBRDMT SUBQ TIS 1ST 20SQCM/<: CPT

## 2020-01-30 PROCEDURE — 27201912 HC WOUND CARE DEBRIDEMENT SUPPLIES

## 2020-01-30 PROCEDURE — 99499 NO LOS: ICD-10-PCS | Mod: ,,, | Performed by: SURGERY

## 2020-01-30 PROCEDURE — 99499 UNLISTED E&M SERVICE: CPT | Mod: ,,, | Performed by: SURGERY

## 2020-01-30 RX ORDER — AMLODIPINE BESYLATE 5 MG/1
5 TABLET ORAL DAILY
COMMUNITY
End: 2022-06-23 | Stop reason: ALTCHOICE

## 2020-01-30 NOTE — PROGRESS NOTES
-- Message is from the [employer]--    Provider paged via [address] [place] - The below message was copied and pasted from a [facility] page:    2018 3:52:33 PM   [employer]   Secure Text   [phone] PATIENT NUMBER -------------------------------- ACC NURSE LINE (IF QUESTIONS ONLY - [phone]) URGENT FROM: Sofia Dominique PATIENT:MARTHA MANZO,:[birth year]. PATIENT WAS SEEN YESTERDAY BY DR. Karla BYERS FOR ASTHMA EXACERBATION, SINUS INFECTION AND WAS PRESCRIBED PREDNISONE AND ANTIBIOTIC. PER PATIENT SHE HAS CONTINOUS COUGH THAT KEEPS HER AWAKE BUT STATED HAS RELIEF FROM ASTHMA MEDICATION. PATIENT WOULD WANT A CALLBACK FROM MD TO PRESCRIBE HER A COUGH MEDICATION. CALL BACK NUMBER [phone].  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  Patient previously was discharged from the Wound Clinic in September of 2018.  At that time, he did have inserts made to relieve pressure.  The patient admits he has not been utilizing his inserts at all.  This is been ongoing for several months.  As of 2/14/2019, he has subsequently developed another wound in the same location of the 2nd metatarsal head protrusion. The patient did have open heart surgery and when he was off if his feet this wound actually healed.  However, he is fairly active.  Stressed the importance of offloading.  Continue collagen with Mepilex Ag.  Wound not as deep today.  There continues to be periwound callus.      Description:Left plantar ulcer  Location: left foot 2nd metatarsal head   Duration:  January 2019   Context: Had cellulitis and in hospital Jan 2016.  Wound last healed September 2018  Modifying Factors: Diabetes and foot deformity as well as noncompliance                  See wound doc progress notes. Documents will be scanned.        Antonio Hidalgo Jr  [facility]

## 2020-02-13 ENCOUNTER — OFFICE VISIT (OUTPATIENT)
Dept: WOUND CARE | Facility: HOSPITAL | Age: 75
End: 2020-02-13
Attending: SURGERY
Payer: MEDICARE

## 2020-02-13 VITALS
HEART RATE: 73 BPM | TEMPERATURE: 98 F | RESPIRATION RATE: 18 BRPM | DIASTOLIC BLOOD PRESSURE: 95 MMHG | SYSTOLIC BLOOD PRESSURE: 150 MMHG

## 2020-02-13 DIAGNOSIS — L97.422 DIABETIC ULCER OF LEFT MIDFOOT ASSOCIATED WITH TYPE 2 DIABETES MELLITUS, WITH FAT LAYER EXPOSED: ICD-10-CM

## 2020-02-13 DIAGNOSIS — E11.621 DIABETIC ULCER OF LEFT MIDFOOT ASSOCIATED WITH TYPE 2 DIABETES MELLITUS, WITH FAT LAYER EXPOSED: ICD-10-CM

## 2020-02-13 PROCEDURE — 99499 NO LOS: ICD-10-PCS | Mod: ,,, | Performed by: SURGERY

## 2020-02-13 PROCEDURE — 27201912 HC WOUND CARE DEBRIDEMENT SUPPLIES

## 2020-02-13 PROCEDURE — 99499 UNLISTED E&M SERVICE: CPT | Mod: ,,, | Performed by: SURGERY

## 2020-02-13 PROCEDURE — 11042 DBRDMT SUBQ TIS 1ST 20SQCM/<: CPT

## 2020-02-13 NOTE — PROGRESS NOTES
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  Patient previously was discharged from the Wound Clinic in September of 2018.  At that time, he did have inserts made to relieve pressure.  The patient admits he has not been utilizing his inserts at all.  This is been ongoing for several months.  As of 2/14/2019, he has subsequently developed another wound in the same location of the 2nd metatarsal head protrusion. The patient did have open heart surgery and when he was off if his feet this wound actually healed.  However, he is fairly active.  Stressed the importance of offloading.  Continue collagen with Mepilex Ag.  Wound not as deep today.  There continues to be periwound callus.  Sharp debridement done today      Description:Left plantar ulcer  Location: left foot 2nd metatarsal head   Duration:  January 2019   Context: Had cellulitis and in hospital Jan 2016.  Wound last healed September 2018  Modifying Factors: Diabetes and foot deformity as well as noncompliance                  See wound doc progress notes. Documents will be scanned.        Antonio Hidalgo Jr  [facility]

## 2020-02-27 ENCOUNTER — OFFICE VISIT (OUTPATIENT)
Dept: WOUND CARE | Facility: HOSPITAL | Age: 75
End: 2020-02-27
Attending: SURGERY
Payer: MEDICARE

## 2020-02-27 VITALS
RESPIRATION RATE: 17 BRPM | TEMPERATURE: 98 F | SYSTOLIC BLOOD PRESSURE: 151 MMHG | DIASTOLIC BLOOD PRESSURE: 93 MMHG | HEART RATE: 67 BPM

## 2020-02-27 DIAGNOSIS — L97.422 DIABETIC ULCER OF LEFT MIDFOOT ASSOCIATED WITH TYPE 2 DIABETES MELLITUS, WITH FAT LAYER EXPOSED: ICD-10-CM

## 2020-02-27 DIAGNOSIS — E11.621 DIABETIC ULCER OF LEFT MIDFOOT ASSOCIATED WITH TYPE 2 DIABETES MELLITUS, WITH FAT LAYER EXPOSED: ICD-10-CM

## 2020-02-27 PROCEDURE — 11042 DBRDMT SUBQ TIS 1ST 20SQCM/<: CPT

## 2020-02-27 PROCEDURE — 99499 NO LOS: ICD-10-PCS | Mod: ,,, | Performed by: SURGERY

## 2020-02-27 PROCEDURE — 99499 UNLISTED E&M SERVICE: CPT | Mod: ,,, | Performed by: SURGERY

## 2020-02-27 PROCEDURE — 27201912 HC WOUND CARE DEBRIDEMENT SUPPLIES

## 2020-02-27 NOTE — PROGRESS NOTES
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  Patient previously was discharged from the Wound Clinic in September of 2018.  At that time, he did have inserts made to relieve pressure.  The patient admits he has not been utilizing his inserts at all.  This is been ongoing for several months.  As of 2/14/2019, he has subsequently developed another wound in the same location of the 2nd metatarsal head protrusion. The patient did have open heart surgery and when he was off if his feet this wound actually healed.  However, he is fairly active.  Stressed the importance of offloading.  Continue collagen with Mepilex Ag.  Wound not as deep today.  There continues to be periwound callus.  Sharp debridement done today.      Description:Left plantar ulcer  Location: left foot 2nd metatarsal head   Duration:  January 2019   Context: Had cellulitis and in hospital Jan 2016.  Wound last healed September 2018  Modifying Factors: Diabetes and foot deformity as well as noncompliance                  See wound doc progress notes. Documents will be scanned.        Antonio Hidalgo Jr  [facility]

## 2020-03-12 ENCOUNTER — OFFICE VISIT (OUTPATIENT)
Dept: WOUND CARE | Facility: HOSPITAL | Age: 75
End: 2020-03-12
Attending: SURGERY
Payer: MEDICARE

## 2020-03-12 VITALS
SYSTOLIC BLOOD PRESSURE: 139 MMHG | HEART RATE: 69 BPM | DIASTOLIC BLOOD PRESSURE: 85 MMHG | TEMPERATURE: 98 F | RESPIRATION RATE: 20 BRPM

## 2020-03-12 DIAGNOSIS — L97.422 DIABETIC ULCER OF LEFT MIDFOOT ASSOCIATED WITH TYPE 2 DIABETES MELLITUS, WITH FAT LAYER EXPOSED: ICD-10-CM

## 2020-03-12 DIAGNOSIS — E11.621 DIABETIC ULCER OF LEFT MIDFOOT ASSOCIATED WITH TYPE 2 DIABETES MELLITUS, WITH FAT LAYER EXPOSED: ICD-10-CM

## 2020-03-12 PROCEDURE — 99499 NO LOS: ICD-10-PCS | Mod: ,,, | Performed by: SURGERY

## 2020-03-12 PROCEDURE — 11042 DBRDMT SUBQ TIS 1ST 20SQCM/<: CPT

## 2020-03-12 PROCEDURE — 27201912 HC WOUND CARE DEBRIDEMENT SUPPLIES

## 2020-03-12 PROCEDURE — 99499 UNLISTED E&M SERVICE: CPT | Mod: ,,, | Performed by: SURGERY

## 2020-03-12 NOTE — PROGRESS NOTES
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  Patient previously was discharged from the [facility]ound Clinic in September of 2018.  At that time, he did have inserts made to relieve pressure.  The patient admits he has not been utilizing his inserts at all.  This is been ongoing for several months.  As of 2/14/2019, he has subsequently developed another wound in the same location of the 2nd metatarsal head protrusion. The patient did have open heart surgery and when he was off if his feet this wound actually healed.  However, he is fairly active.  Stressed the importance of offloading.  Continue collagen with Mepilex Ag and gentian violet to periwound.  Wound not as deep today.  There continues to be periwound callus.  Sharp debridement done today.      Description:Left plantar ulcer  Location: left foot 2nd metatarsal head   Duration:  January 2019   Context: Had cellulitis and in hospital Jan 2016.  Wound last healed September 2018  Modifying Factors: Diabetes and foot deformity as well as noncompliance                  See wound doc progress notes. Documents will be scanned.        Antonio Hidalgo Jr  [facility]

## 2020-03-26 ENCOUNTER — OFFICE VISIT (OUTPATIENT)
Dept: WOUND CARE | Facility: HOSPITAL | Age: 75
End: 2020-03-26
Attending: SURGERY
Payer: MEDICARE

## 2020-03-26 VITALS
TEMPERATURE: 98 F | DIASTOLIC BLOOD PRESSURE: 85 MMHG | RESPIRATION RATE: 19 BRPM | HEART RATE: 63 BPM | SYSTOLIC BLOOD PRESSURE: 139 MMHG

## 2020-03-26 DIAGNOSIS — L97.422 DIABETIC ULCER OF LEFT MIDFOOT ASSOCIATED WITH TYPE 2 DIABETES MELLITUS, WITH FAT LAYER EXPOSED: ICD-10-CM

## 2020-03-26 DIAGNOSIS — E11.621 DIABETIC ULCER OF LEFT MIDFOOT ASSOCIATED WITH TYPE 2 DIABETES MELLITUS, WITH FAT LAYER EXPOSED: ICD-10-CM

## 2020-03-26 PROCEDURE — 27201912 HC WOUND CARE DEBRIDEMENT SUPPLIES

## 2020-03-26 PROCEDURE — 99499 UNLISTED E&M SERVICE: CPT | Mod: ,,, | Performed by: SURGERY

## 2020-03-26 PROCEDURE — 11042 DBRDMT SUBQ TIS 1ST 20SQCM/<: CPT

## 2020-03-26 PROCEDURE — 99499 NO LOS: ICD-10-PCS | Mod: ,,, | Performed by: SURGERY

## 2020-03-26 NOTE — PROGRESS NOTES
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  Patient previously was discharged from the Wound Clinic in September of 2018.  At that time, he did have inserts made to relieve pressure.  The patient admits he has not been utilizing his inserts at all.  This is been ongoing for several months.  As of 2/14/2019, he has subsequently developed another wound in the same location of the 2nd metatarsal head protrusion. The patient did have open heart surgery and when he was off if his feet this wound actually healed.  However, he is fairly active.  Stressed the importance of offloading.  Continue collagen with Mepilex Ag and gentian violet to periwound.  Wound not as deep today.  There continues to be some periwound callus.  Sharp debridement done today.      Description:Left plantar ulcer  Location: left foot 2nd metatarsal head   Duration:  January 2019   Context: Had cellulitis and in hospital Jan 2016.  Wound last healed September 2018  Modifying Factors: Diabetes and foot deformity as well as noncompliance                  See wound doc progress notes. Documents will be scanned.        Antonio Hidalgo Jr  [facility]

## 2020-04-09 ENCOUNTER — OFFICE VISIT (OUTPATIENT)
Dept: WOUND CARE | Facility: HOSPITAL | Age: 75
End: 2020-04-09
Attending: SURGERY
Payer: MEDICARE

## 2020-04-09 VITALS
TEMPERATURE: 98 F | RESPIRATION RATE: 20 BRPM | DIASTOLIC BLOOD PRESSURE: 82 MMHG | HEART RATE: 77 BPM | SYSTOLIC BLOOD PRESSURE: 128 MMHG

## 2020-04-09 DIAGNOSIS — L97.422 DIABETIC ULCER OF LEFT MIDFOOT ASSOCIATED WITH TYPE 2 DIABETES MELLITUS, WITH FAT LAYER EXPOSED: ICD-10-CM

## 2020-04-09 DIAGNOSIS — E11.621 DIABETIC ULCER OF LEFT MIDFOOT ASSOCIATED WITH TYPE 2 DIABETES MELLITUS, WITH FAT LAYER EXPOSED: ICD-10-CM

## 2020-04-09 PROCEDURE — 27201912 HC WOUND CARE DEBRIDEMENT SUPPLIES

## 2020-04-09 PROCEDURE — 11042 DBRDMT SUBQ TIS 1ST 20SQCM/<: CPT

## 2020-04-09 PROCEDURE — 99499 UNLISTED E&M SERVICE: CPT | Mod: ,,, | Performed by: SURGERY

## 2020-04-09 PROCEDURE — 99499 NO LOS: ICD-10-PCS | Mod: ,,, | Performed by: SURGERY

## 2020-04-09 NOTE — PROGRESS NOTES
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  Patient previously was discharged from the Wound Clinic in September of 2018.  At that time, he did have inserts made to relieve pressure.  The patient admits he has not been utilizing his inserts at all.  This is been ongoing for several months.  As of 2/14/2019, he has subsequently developed another wound in the same location of the 2nd metatarsal head protrusion. The patient did have open heart surgery and when he was off if his feet this wound actually healed.  However, he is fairly active.  Stressed the importance of offloading.  Continue collagen with Mepilex Ag and gentian violet to periwound.  Wound not as deep today.  There continues to be some periwound callus.  Sharp debridement done today.  No major changes      Description:Left plantar ulcer  Location: left foot 2nd metatarsal head   Duration:  January 2019   Context: Had cellulitis and in hospital Jan 2016.  Wound last healed September 2018  Modifying Factors: Diabetes and foot deformity as well as noncompliance                  See wound doc progress notes. Documents will be scanned.        Antonio Hidalgo Jr  [facility]

## 2020-04-23 ENCOUNTER — OFFICE VISIT (OUTPATIENT)
Dept: WOUND CARE | Facility: HOSPITAL | Age: 75
End: 2020-04-23
Attending: SURGERY
Payer: MEDICARE

## 2020-04-23 VITALS
DIASTOLIC BLOOD PRESSURE: 76 MMHG | TEMPERATURE: 99 F | RESPIRATION RATE: 18 BRPM | SYSTOLIC BLOOD PRESSURE: 119 MMHG | HEART RATE: 71 BPM

## 2020-04-23 DIAGNOSIS — L97.522 NON-PRESSURE CHRONIC ULCER OF OTHER PART OF LEFT FOOT WITH FAT LAYER EXPOSED: ICD-10-CM

## 2020-04-23 DIAGNOSIS — E11.621 DIABETIC ULCER OF LEFT MIDFOOT ASSOCIATED WITH TYPE 2 DIABETES MELLITUS, WITH FAT LAYER EXPOSED: Primary | ICD-10-CM

## 2020-04-23 DIAGNOSIS — L97.422 DIABETIC ULCER OF LEFT MIDFOOT ASSOCIATED WITH TYPE 2 DIABETES MELLITUS, WITH FAT LAYER EXPOSED: Primary | ICD-10-CM

## 2020-04-23 DIAGNOSIS — E11.610 TYPE 2 DIABETES MELLITUS WITH DIABETIC NEUROPATHIC ARTHROPATHY: ICD-10-CM

## 2020-04-23 PROCEDURE — 99499 UNLISTED E&M SERVICE: CPT | Mod: ,,, | Performed by: SURGERY

## 2020-04-23 PROCEDURE — 99499 NO LOS: ICD-10-PCS | Mod: ,,, | Performed by: SURGERY

## 2020-04-23 PROCEDURE — 27201912 HC WOUND CARE DEBRIDEMENT SUPPLIES

## 2020-04-23 PROCEDURE — 11042 DBRDMT SUBQ TIS 1ST 20SQCM/<: CPT

## 2020-04-23 NOTE — PROGRESS NOTES
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  Patient previously was discharged from the Wound Clinic in September of 2018.  At that time, he did have inserts made to relieve pressure.  The patient admits he has not been utilizing his inserts at all.  This is been ongoing for several months.  As of 2/14/2019, he has subsequently developed another wound in the same location of the 2nd metatarsal head protrusion. The patient did have open heart surgery and when he was off if his feet this wound actually healed.  However, he is fairly active.  Stressed the importance of offloading.  Continue collagen with Mepilex Ag and gentian violet to periwound.  Wound not as deep today.  There continues to be some periwound callus.  Sharp debridement done today.  No major changes      Description:Left plantar ulcer  Location: left foot 2nd metatarsal head   Duration:  January 2019   Context: Had cellulitis and in hospital Jan 2016.  Wound last healed September 2018  Modifying Factors: Diabetes and foot deformity as well as noncompliance            Current Assessment & Plan     The wound is small and superficial continues to improve continue with program.               See wound doc progress notes. Documents will be scanned.        Gonzalez Alvarado  [facility]

## 2020-05-07 ENCOUNTER — OFFICE VISIT (OUTPATIENT)
Dept: WOUND CARE | Facility: HOSPITAL | Age: 75
End: 2020-05-07
Attending: SURGERY
Payer: MEDICARE

## 2020-05-07 VITALS
DIASTOLIC BLOOD PRESSURE: 90 MMHG | RESPIRATION RATE: 18 BRPM | HEART RATE: 72 BPM | TEMPERATURE: 97 F | SYSTOLIC BLOOD PRESSURE: 145 MMHG

## 2020-05-07 DIAGNOSIS — E11.621 DIABETIC ULCER OF LEFT MIDFOOT ASSOCIATED WITH TYPE 2 DIABETES MELLITUS, WITH FAT LAYER EXPOSED: ICD-10-CM

## 2020-05-07 DIAGNOSIS — L97.422 DIABETIC ULCER OF LEFT MIDFOOT ASSOCIATED WITH TYPE 2 DIABETES MELLITUS, WITH FAT LAYER EXPOSED: ICD-10-CM

## 2020-05-07 DIAGNOSIS — E11.610 TYPE 2 DIABETES MELLITUS WITH DIABETIC NEUROPATHIC ARTHROPATHY: Primary | ICD-10-CM

## 2020-05-07 DIAGNOSIS — L97.522 NON-PRESSURE CHRONIC ULCER OF OTHER PART OF LEFT FOOT WITH FAT LAYER EXPOSED: ICD-10-CM

## 2020-05-07 PROCEDURE — 99499 NO LOS: ICD-10-PCS | Mod: ,,, | Performed by: SURGERY

## 2020-05-07 PROCEDURE — 11042 DBRDMT SUBQ TIS 1ST 20SQCM/<: CPT

## 2020-05-07 PROCEDURE — 27201912 HC WOUND CARE DEBRIDEMENT SUPPLIES

## 2020-05-07 PROCEDURE — 99499 UNLISTED E&M SERVICE: CPT | Mod: ,,, | Performed by: SURGERY

## 2020-05-07 NOTE — PROGRESS NOTES
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  Patient previously was discharged from the Wound Clinic in September of 2018.  At that time, he did have inserts made to relieve pressure.  The patient admits he has not been utilizing his inserts at all.  This is been ongoing for several months.  As of 2/14/2019, he has subsequently developed another wound in the same location of the 2nd metatarsal head protrusion. The patient did have open heart surgery and when he was off if his feet this wound actually healed.  However, he is fairly active.  Stressed the importance of offloading.  Continue collagen with Mepilex Ag and gentian violet to periwound.  Wound not as deep today.  There continues to be some periwound callus.  Sharp debridement done today.  No major changes.      Description:Left plantar ulcer  Location: left foot 2nd metatarsal head   Duration:  January 2019   Context: Had cellulitis and in hospital Jan 2016.  Wound last healed September 2018  Modifying Factors: Diabetes and foot deformity as well as noncompliance                  See wound doc progress notes. Documents will be scanned.        Antonio Hidalgo Jr  [facility]

## 2020-05-21 ENCOUNTER — OFFICE VISIT (OUTPATIENT)
Dept: WOUND CARE | Facility: HOSPITAL | Age: 75
End: 2020-05-21
Attending: SURGERY
Payer: MEDICARE

## 2020-05-21 VITALS — DIASTOLIC BLOOD PRESSURE: 93 MMHG | TEMPERATURE: 98 F | SYSTOLIC BLOOD PRESSURE: 144 MMHG | RESPIRATION RATE: 18 BRPM

## 2020-05-21 DIAGNOSIS — L97.422 DIABETIC ULCER OF LEFT MIDFOOT ASSOCIATED WITH TYPE 2 DIABETES MELLITUS, WITH FAT LAYER EXPOSED: ICD-10-CM

## 2020-05-21 DIAGNOSIS — E11.621 DIABETIC ULCER OF LEFT MIDFOOT ASSOCIATED WITH TYPE 2 DIABETES MELLITUS, WITH FAT LAYER EXPOSED: ICD-10-CM

## 2020-05-21 PROCEDURE — 27201912 HC WOUND CARE DEBRIDEMENT SUPPLIES

## 2020-05-21 PROCEDURE — 99499 NO LOS: ICD-10-PCS | Mod: ,,, | Performed by: SURGERY

## 2020-05-21 PROCEDURE — 11042 DBRDMT SUBQ TIS 1ST 20SQCM/<: CPT

## 2020-05-21 PROCEDURE — 99499 UNLISTED E&M SERVICE: CPT | Mod: ,,, | Performed by: SURGERY

## 2020-06-04 ENCOUNTER — OFFICE VISIT (OUTPATIENT)
Dept: WOUND CARE | Facility: HOSPITAL | Age: 75
End: 2020-06-04
Attending: SURGERY
Payer: MEDICARE

## 2020-06-04 VITALS
SYSTOLIC BLOOD PRESSURE: 121 MMHG | RESPIRATION RATE: 18 BRPM | HEART RATE: 67 BPM | DIASTOLIC BLOOD PRESSURE: 77 MMHG | TEMPERATURE: 98 F

## 2020-06-04 DIAGNOSIS — L97.422 DIABETIC ULCER OF LEFT MIDFOOT ASSOCIATED WITH TYPE 2 DIABETES MELLITUS, WITH FAT LAYER EXPOSED: ICD-10-CM

## 2020-06-04 DIAGNOSIS — E11.610 TYPE 2 DIABETES MELLITUS WITH DIABETIC NEUROPATHIC ARTHROPATHY: Primary | ICD-10-CM

## 2020-06-04 DIAGNOSIS — E11.621 DIABETIC ULCER OF LEFT MIDFOOT ASSOCIATED WITH TYPE 2 DIABETES MELLITUS, WITH FAT LAYER EXPOSED: ICD-10-CM

## 2020-06-04 DIAGNOSIS — L97.522 NON-PRESSURE CHRONIC ULCER OF OTHER PART OF LEFT FOOT WITH FAT LAYER EXPOSED: ICD-10-CM

## 2020-06-04 PROCEDURE — 99499 UNLISTED E&M SERVICE: CPT | Mod: ,,, | Performed by: SURGERY

## 2020-06-04 PROCEDURE — 27201912 HC WOUND CARE DEBRIDEMENT SUPPLIES

## 2020-06-04 PROCEDURE — 99499 NO LOS: ICD-10-PCS | Mod: ,,, | Performed by: SURGERY

## 2020-06-04 PROCEDURE — 11042 DBRDMT SUBQ TIS 1ST 20SQCM/<: CPT

## 2020-06-04 NOTE — PROGRESS NOTES
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  Patient previously was discharged from the Wound Clinic in September of 2018.  At that time, he did have inserts made to relieve pressure.  The patient admits he has not been utilizing his inserts at all.  This is been ongoing for several months.  As of 2/14/2019, he has subsequently developed another wound in the same location of the 2nd metatarsal head protrusion. The patient did have open heart surgery and when he was off if his feet this wound actually healed.  However, he is fairly active.  Stressed the importance of offloading.  Continue collagen with Mepilex Ag and gentian violet to periwound.  Wound not as deep today.  There continues to be some periwound callus.  Sharp debridement done today.  No major changes.  However, wound is making slow progress.      Description:Left plantar ulcer  Location: left foot 2nd metatarsal head   Duration:  January 2019   Context: Had cellulitis and in hospital Jan 2016.  Wound last healed September 2018  Modifying Factors: Diabetes and foot deformity as well as noncompliance                  See wound doc progress notes. Documents will be scanned.        Antonio Hidalgo Jr  [facility]

## 2020-06-18 ENCOUNTER — OFFICE VISIT (OUTPATIENT)
Dept: WOUND CARE | Facility: HOSPITAL | Age: 75
End: 2020-06-18
Attending: SURGERY
Payer: MEDICARE

## 2020-06-18 VITALS
DIASTOLIC BLOOD PRESSURE: 73 MMHG | TEMPERATURE: 97 F | SYSTOLIC BLOOD PRESSURE: 112 MMHG | HEART RATE: 65 BPM | RESPIRATION RATE: 20 BRPM

## 2020-06-18 DIAGNOSIS — L97.422 DIABETIC ULCER OF LEFT MIDFOOT ASSOCIATED WITH TYPE 2 DIABETES MELLITUS, WITH FAT LAYER EXPOSED: ICD-10-CM

## 2020-06-18 DIAGNOSIS — E11.621 DIABETIC ULCER OF LEFT MIDFOOT ASSOCIATED WITH TYPE 2 DIABETES MELLITUS, WITH FAT LAYER EXPOSED: ICD-10-CM

## 2020-06-18 PROCEDURE — 11042 DBRDMT SUBQ TIS 1ST 20SQCM/<: CPT

## 2020-06-18 PROCEDURE — 27201912 HC WOUND CARE DEBRIDEMENT SUPPLIES

## 2020-06-18 PROCEDURE — 99499 UNLISTED E&M SERVICE: CPT | Mod: ,,, | Performed by: SURGERY

## 2020-06-18 PROCEDURE — 99499 NO LOS: ICD-10-PCS | Mod: ,,, | Performed by: SURGERY

## 2020-07-02 ENCOUNTER — OFFICE VISIT (OUTPATIENT)
Dept: WOUND CARE | Facility: HOSPITAL | Age: 75
End: 2020-07-02
Attending: SURGERY
Payer: MEDICARE

## 2020-07-02 VITALS — HEART RATE: 62 BPM | DIASTOLIC BLOOD PRESSURE: 84 MMHG | SYSTOLIC BLOOD PRESSURE: 160 MMHG | TEMPERATURE: 98 F

## 2020-07-02 DIAGNOSIS — L97.422 DIABETIC ULCER OF LEFT MIDFOOT ASSOCIATED WITH TYPE 2 DIABETES MELLITUS, WITH FAT LAYER EXPOSED: Primary | ICD-10-CM

## 2020-07-02 DIAGNOSIS — L97.522 ULCER OF LEFT FOOT, WITH FAT LAYER EXPOSED: ICD-10-CM

## 2020-07-02 DIAGNOSIS — E11.610 DIABETIC NEUROGENIC ARTHROPATHY: ICD-10-CM

## 2020-07-02 DIAGNOSIS — E11.621 DIABETIC ULCER OF LEFT MIDFOOT ASSOCIATED WITH TYPE 2 DIABETES MELLITUS, WITH FAT LAYER EXPOSED: Primary | ICD-10-CM

## 2020-07-02 PROCEDURE — 11042 DBRDMT SUBQ TIS 1ST 20SQCM/<: CPT

## 2020-07-02 PROCEDURE — 99499 UNLISTED E&M SERVICE: CPT | Mod: ,,, | Performed by: SURGERY

## 2020-07-02 PROCEDURE — 27201912 HC WOUND CARE DEBRIDEMENT SUPPLIES

## 2020-07-02 PROCEDURE — 99499 NO LOS: ICD-10-PCS | Mod: ,,, | Performed by: SURGERY

## 2020-07-02 NOTE — PROGRESS NOTES
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  Patient previously was discharged from the Wound Clinic in September of 2018.  At that time, he did have inserts made to relieve pressure.  The patient admits he has not been utilizing his inserts at all.  This is been ongoing for several months.  As of 2/14/2019, he has subsequently developed another wound in the same location of the 2nd metatarsal head protrusion. The patient did have open heart surgery and when he was off if his feet this wound actually healed.  However, he is fairly active.  Stressed the importance of offloading.  Continue collagen with Mepilex Ag and gentian violet to periwound.  Wound not as deep today. There continues to be some periwound callus.  Sharp debridement done today.  No major changes.  However, wound is making slow progress.      Description:Left plantar ulcer  Location: left foot 2nd metatarsal head   Duration:  January 2019   Context: Had cellulitis and in hospital Jan 2016.  Wound last healed September 2018  Modifying Factors: Diabetes and foot deformity as well as noncompliance                  See wound doc progress notes. Documents will be scanned.        Antonio Hidalgo Jr  [facility]

## 2020-07-16 ENCOUNTER — OFFICE VISIT (OUTPATIENT)
Dept: WOUND CARE | Facility: HOSPITAL | Age: 75
End: 2020-07-16
Attending: SURGERY
Payer: MEDICARE

## 2020-07-16 VITALS — SYSTOLIC BLOOD PRESSURE: 125 MMHG | TEMPERATURE: 98 F | HEART RATE: 54 BPM | DIASTOLIC BLOOD PRESSURE: 61 MMHG

## 2020-07-16 DIAGNOSIS — L97.422 DIABETIC ULCER OF LEFT MIDFOOT ASSOCIATED WITH TYPE 2 DIABETES MELLITUS, WITH FAT LAYER EXPOSED: Primary | ICD-10-CM

## 2020-07-16 DIAGNOSIS — E11.621 DIABETIC ULCER OF LEFT MIDFOOT ASSOCIATED WITH TYPE 2 DIABETES MELLITUS, WITH FAT LAYER EXPOSED: Primary | ICD-10-CM

## 2020-07-16 PROCEDURE — 11042 DBRDMT SUBQ TIS 1ST 20SQCM/<: CPT

## 2020-07-16 PROCEDURE — 27201912 HC WOUND CARE DEBRIDEMENT SUPPLIES

## 2020-07-16 PROCEDURE — 99499 UNLISTED E&M SERVICE: CPT | Mod: ,,, | Performed by: SURGERY

## 2020-07-16 PROCEDURE — 99499 NO LOS: ICD-10-PCS | Mod: ,,, | Performed by: SURGERY

## 2020-07-16 NOTE — PROGRESS NOTES
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  Patient previously was discharged from the Wound Clinic in September of 2018.  At that time, he did have inserts made to relieve pressure.  The patient admits he has not been utilizing his inserts at all.  This is been ongoing for several months.  As of 2/14/2019, he has subsequently developed another wound in the same location of the 2nd metatarsal head protrusion. The patient did have open heart surgery and when he was off if his feet this wound actually healed.  However, he is fairly active.  Stressed the importance of offloading.  Continue collagen with Mepilex Ag and gentian violet to periwound.  Wound not as deep today. There continues to be some periwound callus.  Sharp debridement done today.  No major changes.  However, wound is making slow progress.      Description:Left plantar ulcer  Location: left foot 2nd metatarsal head   Duration:  January 2019   Context: Had cellulitis and in hospital Jan 2016.  Wound last healed September 2018  Modifying Factors: Diabetes and foot deformity as well as noncompliance            Current Assessment & Plan     The wound continues to improve.  Some callus removed but not much are round.  The wound is less deep and healing well continue same and offloading and wound care               See wound doc progress notes. Documents will be scanned.        Gonzalez Alvarado  [facility]

## 2020-07-16 NOTE — ASSESSMENT & PLAN NOTE
The wound continues to improve.  Some callus removed but not much are round.  The wound is less deep and healing well continue same and offloading and wound care

## 2020-07-30 ENCOUNTER — OFFICE VISIT (OUTPATIENT)
Dept: WOUND CARE | Facility: HOSPITAL | Age: 75
End: 2020-07-30
Attending: SURGERY
Payer: MEDICARE

## 2020-07-30 VITALS
HEART RATE: 66 BPM | RESPIRATION RATE: 20 BRPM | DIASTOLIC BLOOD PRESSURE: 76 MMHG | SYSTOLIC BLOOD PRESSURE: 124 MMHG | TEMPERATURE: 97 F

## 2020-07-30 DIAGNOSIS — L97.422 DIABETIC ULCER OF LEFT MIDFOOT ASSOCIATED WITH TYPE 2 DIABETES MELLITUS, WITH FAT LAYER EXPOSED: ICD-10-CM

## 2020-07-30 DIAGNOSIS — E11.621 DIABETIC ULCER OF LEFT MIDFOOT ASSOCIATED WITH TYPE 2 DIABETES MELLITUS, WITH FAT LAYER EXPOSED: ICD-10-CM

## 2020-07-30 PROCEDURE — 99499 NO LOS: ICD-10-PCS | Mod: ,,, | Performed by: SURGERY

## 2020-07-30 PROCEDURE — 11042 DBRDMT SUBQ TIS 1ST 20SQCM/<: CPT

## 2020-07-30 PROCEDURE — 27201912 HC WOUND CARE DEBRIDEMENT SUPPLIES

## 2020-07-30 PROCEDURE — 99499 UNLISTED E&M SERVICE: CPT | Mod: ,,, | Performed by: SURGERY

## 2020-07-30 NOTE — PROGRESS NOTES
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  Patient previously was discharged from the Wound Clinic in September of 2018.  At that time, he did have inserts made to relieve pressure.  The patient admits he has not been utilizing his inserts at all.  This is been ongoing for several months.  As of 2/14/2019, he has subsequently developed another wound in the same location of the 2nd metatarsal head protrusion. The patient did have open heart surgery and when he was off if his feet this wound actually healed.  However, he is fairly active.  Stressed the importance of offloading.  Continue with Mepilex Ag and gentian violet to periwound.  Wound not as deep today. There continues to be some periwound callus.  Sharp debridement done today.  No major changes.  However, wound is making slow progress.      Description:Left plantar ulcer  Location: left foot 2nd metatarsal head   Duration:  January 2019   Context: Had cellulitis and in hospital Jan 2016.  Wound last healed September 2018  Modifying Factors: Diabetes and foot deformity as well as noncompliance                  See wound doc progress notes. Documents will be scanned.        Antonio Hidalgo Jr  [facility]

## 2020-08-13 ENCOUNTER — OFFICE VISIT (OUTPATIENT)
Dept: WOUND CARE | Facility: HOSPITAL | Age: 75
End: 2020-08-13
Attending: SURGERY
Payer: MEDICARE

## 2020-08-13 VITALS
DIASTOLIC BLOOD PRESSURE: 77 MMHG | RESPIRATION RATE: 20 BRPM | HEART RATE: 82 BPM | TEMPERATURE: 98 F | SYSTOLIC BLOOD PRESSURE: 134 MMHG

## 2020-08-13 DIAGNOSIS — E11.621 DIABETIC ULCER OF LEFT MIDFOOT ASSOCIATED WITH TYPE 2 DIABETES MELLITUS, WITH FAT LAYER EXPOSED: Primary | ICD-10-CM

## 2020-08-13 DIAGNOSIS — E11.610 CHARCOT'S JOINT ARTHROPATHY IN TYPE 2 DIABETES MELLITUS: ICD-10-CM

## 2020-08-13 DIAGNOSIS — L97.422 DIABETIC ULCER OF LEFT MIDFOOT ASSOCIATED WITH TYPE 2 DIABETES MELLITUS, WITH FAT LAYER EXPOSED: Primary | ICD-10-CM

## 2020-08-13 PROCEDURE — 11042 DBRDMT SUBQ TIS 1ST 20SQCM/<: CPT

## 2020-08-13 PROCEDURE — 99499 UNLISTED E&M SERVICE: CPT | Mod: ,,, | Performed by: SURGERY

## 2020-08-13 PROCEDURE — 99499 NO LOS: ICD-10-PCS | Mod: ,,, | Performed by: SURGERY

## 2020-08-13 PROCEDURE — 27201912 HC WOUND CARE DEBRIDEMENT SUPPLIES

## 2020-08-13 NOTE — PROGRESS NOTES
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  Patient previously was discharged from the Wound Clinic in September of 2018.  At that time, he did have inserts made to relieve pressure.  The patient admits he has not been utilizing his inserts at all.  This is been ongoing for several months.  As of 2/14/2019, he has subsequently developed another wound in the same location of the 2nd metatarsal head protrusion. The patient did have open heart surgery and when he was off if his feet this wound actually healed.  However, he is fairly active.  Stressed the importance of offloading.  Continue with Mepilex Ag and gentian violet to periwound.  Wound not as deep today. There continues to be some periwound callus.  Sharp debridement done today.  No major changes.  However, wound is making slow progress.      Description:Left plantar ulcer  Location: left foot 2nd metatarsal head   Duration:  January 2019   Context: Had cellulitis and in hospital Jan 2016.  Wound last healed September 2018  Modifying Factors: Diabetes and foot deformity as well as noncompliance            Charcot's joint arthropathy in type 2 diabetes mellitus    Overview     Patient has long-time foot deformity on the left. He has plantar protrusion of the left 2nd metatarsal head.  This has made him prone to ulceration in this region.  On 02/14/2019, I have recommended to the patient either long-term use of offloading diabetic shoes with inserts or surgery to correct the foot deformity by a podiatrist.  He would like to utilize diabetic shoes.  He has been instructed to obtain  orders for diabetic shoes from his primary care physician Dr. Chowdhury.         Current Assessment & Plan     The wound continues to improve.  Continue offloading and promogram               See wound doc progress notes. Documents will be scanned.        Gonzalez Alvarado  [facility]

## 2020-08-27 ENCOUNTER — OFFICE VISIT (OUTPATIENT)
Dept: WOUND CARE | Facility: HOSPITAL | Age: 75
End: 2020-08-27
Attending: SURGERY
Payer: MEDICARE

## 2020-08-27 VITALS
SYSTOLIC BLOOD PRESSURE: 118 MMHG | HEART RATE: 63 BPM | DIASTOLIC BLOOD PRESSURE: 74 MMHG | RESPIRATION RATE: 19 BRPM | TEMPERATURE: 98 F

## 2020-08-27 DIAGNOSIS — L97.422 DIABETIC ULCER OF LEFT MIDFOOT ASSOCIATED WITH TYPE 2 DIABETES MELLITUS, WITH FAT LAYER EXPOSED: Primary | ICD-10-CM

## 2020-08-27 DIAGNOSIS — E11.621 DIABETIC ULCER OF LEFT MIDFOOT ASSOCIATED WITH TYPE 2 DIABETES MELLITUS, WITH FAT LAYER EXPOSED: Primary | ICD-10-CM

## 2020-08-27 PROCEDURE — 99499 UNLISTED E&M SERVICE: CPT | Mod: ,,, | Performed by: SURGERY

## 2020-08-27 PROCEDURE — 11042 DBRDMT SUBQ TIS 1ST 20SQCM/<: CPT

## 2020-08-27 PROCEDURE — 99499 NO LOS: ICD-10-PCS | Mod: ,,, | Performed by: SURGERY

## 2020-08-27 PROCEDURE — 27201912 HC WOUND CARE DEBRIDEMENT SUPPLIES

## 2020-08-27 NOTE — PROGRESS NOTES
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  Patient previously was discharged from the Wound Clinic in September of 2018.  At that time, he did have inserts made to relieve pressure.  The patient admits he has not been utilizing his inserts at all.  This is been ongoing for several months.  As of 2/14/2019, he has subsequently developed another wound in the same location of the 2nd metatarsal head protrusion. The patient did have open heart surgery and when he was off if his feet this wound actually healed.  However, he is fairly active.  Stressed the importance of offloading.  Continue with Mepilex Ag and gentian violet to periwound.  Wound not as deep today. There continues to be some periwound callus.  Sharp debridement done today.  No major changes.  However, wound is making slow progress.      Description:Left plantar ulcer  Location: left foot 2nd metatarsal head   Duration:  January 2019   Context: Had cellulitis and in hospital Jan 2016.  Wound last healed September 2018  Modifying Factors: Diabetes and foot deformity as well as noncompliance            Current Assessment & Plan     More macerated today more callus around the edges.  Debrided callus and base of wound with curette.  Try to offload more.               See wound doc progress notes. Documents will be scanned.        Gonzalez Alvarado  [facility]

## 2020-08-27 NOTE — ASSESSMENT & PLAN NOTE
More macerated today more callus around the edges.  Debrided callus and base of wound with curette.  Try to offload more.

## 2020-09-10 ENCOUNTER — OFFICE VISIT (OUTPATIENT)
Dept: WOUND CARE | Facility: HOSPITAL | Age: 75
End: 2020-09-10
Attending: SURGERY
Payer: MEDICARE

## 2020-09-10 VITALS
DIASTOLIC BLOOD PRESSURE: 76 MMHG | RESPIRATION RATE: 18 BRPM | TEMPERATURE: 98 F | SYSTOLIC BLOOD PRESSURE: 122 MMHG | HEART RATE: 61 BPM

## 2020-09-10 DIAGNOSIS — L97.422 DIABETIC ULCER OF LEFT MIDFOOT ASSOCIATED WITH TYPE 2 DIABETES MELLITUS, WITH FAT LAYER EXPOSED: Primary | ICD-10-CM

## 2020-09-10 DIAGNOSIS — E11.621 DIABETIC ULCER OF LEFT MIDFOOT ASSOCIATED WITH TYPE 2 DIABETES MELLITUS, WITH FAT LAYER EXPOSED: Primary | ICD-10-CM

## 2020-09-10 DIAGNOSIS — E11.610 CHARCOT'S JOINT ARTHROPATHY IN TYPE 2 DIABETES MELLITUS: ICD-10-CM

## 2020-09-10 PROCEDURE — 27201912 HC WOUND CARE DEBRIDEMENT SUPPLIES

## 2020-09-10 PROCEDURE — 11042 DBRDMT SUBQ TIS 1ST 20SQCM/<: CPT

## 2020-09-10 PROCEDURE — 99499 NO LOS: ICD-10-PCS | Mod: ,,, | Performed by: SURGERY

## 2020-09-10 PROCEDURE — 99499 UNLISTED E&M SERVICE: CPT | Mod: ,,, | Performed by: SURGERY

## 2020-09-10 NOTE — PROGRESS NOTES
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  Patient previously was discharged from the Wound Clinic in September of 2018.  At that time, he did have inserts made to relieve pressure.  The patient admits he has not been utilizing his inserts at all.  This is been ongoing for several months.  As of 2/14/2019, he has subsequently developed another wound in the same location of the 2nd metatarsal head protrusion. The patient did have open heart surgery and when he was off if his feet this wound actually healed.  However, he is fairly active.  Stressed the importance of offloading.  Continue with Mepilex Ag and gentian violet to periwound.  Wound not as deep today. There continues to be some periwound callus.  Sharp debridement done today.  No major changes.  However, wound is making slow progress.      Description:Left plantar ulcer  Location: left foot 2nd metatarsal head   Duration:  January 2019   Context: Had cellulitis and in hospital Jan 2016.  Wound last healed September 2018  Modifying Factors: Diabetes and foot deformity as well as noncompliance            Charcot's joint arthropathy in type 2 diabetes mellitus    Overview     Patient has long-time foot deformity on the left. He has plantar protrusion of the left 2nd metatarsal head.  This has made him prone to ulceration in this region.  On 02/14/2019, I have recommended to the patient either long-term use of offloading diabetic shoes with inserts or surgery to correct the foot deformity by a podiatrist.  He would like to utilize diabetic shoes.  He has been instructed to obtain  orders for diabetic shoes from his primary care physician Dr. Chowdhury.         Current Assessment & Plan     It is very shallow very clean out the debridement.  Will apply Promogran only               See wound doc progress notes. Documents will be scanned.        Gonzalez BRADLEY Alvarado  [facility]

## 2020-09-24 ENCOUNTER — OFFICE VISIT (OUTPATIENT)
Dept: WOUND CARE | Facility: HOSPITAL | Age: 75
End: 2020-09-24
Attending: SURGERY
Payer: MEDICARE

## 2020-09-24 VITALS
TEMPERATURE: 98 F | DIASTOLIC BLOOD PRESSURE: 99 MMHG | HEART RATE: 66 BPM | SYSTOLIC BLOOD PRESSURE: 162 MMHG | RESPIRATION RATE: 18 BRPM

## 2020-09-24 DIAGNOSIS — E11.610 CHARCOT'S JOINT ARTHROPATHY IN TYPE 2 DIABETES MELLITUS: Primary | ICD-10-CM

## 2020-09-24 DIAGNOSIS — E11.621 DIABETIC ULCER OF LEFT MIDFOOT ASSOCIATED WITH TYPE 2 DIABETES MELLITUS, WITH FAT LAYER EXPOSED: ICD-10-CM

## 2020-09-24 DIAGNOSIS — L97.422 DIABETIC ULCER OF LEFT MIDFOOT ASSOCIATED WITH TYPE 2 DIABETES MELLITUS, WITH FAT LAYER EXPOSED: ICD-10-CM

## 2020-09-24 PROCEDURE — 11042 DBRDMT SUBQ TIS 1ST 20SQCM/<: CPT

## 2020-09-24 PROCEDURE — 99499 NO LOS: ICD-10-PCS | Mod: ,,, | Performed by: SURGERY

## 2020-09-24 PROCEDURE — 27201912 HC WOUND CARE DEBRIDEMENT SUPPLIES

## 2020-09-24 PROCEDURE — 99499 UNLISTED E&M SERVICE: CPT | Mod: ,,, | Performed by: SURGERY

## 2020-09-24 NOTE — PROGRESS NOTES
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  Patient previously was discharged from the Wound Clinic in September of 2018.  At that time, he did have inserts made to relieve pressure.  The patient admits he has not been utilizing his inserts at all.  This is been ongoing for several months.  As of 2/14/2019, he has subsequently developed another wound in the same location of the 2nd metatarsal head protrusion. The patient did have open heart surgery and when he was off if his feet this wound actually healed.  However, he is fairly active.  Stressed the importance of offloading.  Continue with Mepilex Ag and gentian violet to periwound.  Wound not as deep today. There continues to be some periwound callus.  Sharp debridement done today.  No major changes.  However, wound is making slow progress.      Description:Left plantar ulcer  Location: left foot 2nd metatarsal head   Duration:  January 2019   Context: Had cellulitis and in hospital Jan 2016.  Wound last healed September 2018  Modifying Factors: Diabetes and foot deformity as well as noncompliance            Charcot's joint arthropathy in type 2 diabetes mellitus - Primary    Overview     Patient has long-time foot deformity on the left. He has plantar protrusion of the left 2nd metatarsal head.  This has made him prone to ulceration in this region.  On 02/14/2019, I have recommended to the patient either long-term use of offloading diabetic shoes with inserts or surgery to correct the foot deformity by a podiatrist.  He would like to utilize diabetic shoes.  He has been instructed to obtain  orders for diabetic shoes from his primary care physician Dr. Chowdhury.         Current Assessment & Plan     Wound is fairly superficial.  Large amount of callus removed inferiorly.  Will place Promogram as patient has been on silver product for some time               See wound doc progress notes. Documents will be scanned.        Gonzalez BRADLEY Alvarado  [facility]

## 2020-09-24 NOTE — ASSESSMENT & PLAN NOTE
Wound is fairly superficial.  Large amount of callus removed inferiorly.  Will place Promogram as patient has been on silver product for some time

## 2020-10-08 ENCOUNTER — OFFICE VISIT (OUTPATIENT)
Dept: WOUND CARE | Facility: HOSPITAL | Age: 75
End: 2020-10-08
Attending: SURGERY
Payer: MEDICARE

## 2020-10-08 VITALS
TEMPERATURE: 98 F | DIASTOLIC BLOOD PRESSURE: 80 MMHG | HEART RATE: 65 BPM | SYSTOLIC BLOOD PRESSURE: 128 MMHG | RESPIRATION RATE: 18 BRPM

## 2020-10-08 DIAGNOSIS — E11.621 DIABETIC ULCER OF LEFT MIDFOOT ASSOCIATED WITH TYPE 2 DIABETES MELLITUS, WITH FAT LAYER EXPOSED: ICD-10-CM

## 2020-10-08 DIAGNOSIS — L97.422 DIABETIC ULCER OF LEFT MIDFOOT ASSOCIATED WITH TYPE 2 DIABETES MELLITUS, WITH FAT LAYER EXPOSED: ICD-10-CM

## 2020-10-08 PROCEDURE — 99499 NO LOS: ICD-10-PCS | Mod: ,,, | Performed by: SURGERY

## 2020-10-08 PROCEDURE — 11042 DBRDMT SUBQ TIS 1ST 20SQCM/<: CPT

## 2020-10-08 PROCEDURE — 99499 UNLISTED E&M SERVICE: CPT | Mod: ,,, | Performed by: SURGERY

## 2020-10-08 PROCEDURE — 27201912 HC WOUND CARE DEBRIDEMENT SUPPLIES

## 2020-10-08 NOTE — PROGRESS NOTES
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  Patient previously was discharged from the [facility] in September of 2018.  At that time, he did have inserts made to relieve pressure.  The patient admits he has not been utilizing his inserts at all.  This is been ongoing for several months.  As of 2/14/2019, he has subsequently developed another wound in the same location of the 2nd metatarsal head protrusion. The patient did have open heart surgery and when he was off if his feet this wound actually healed.  However, he is fairly active.  Stressed the importance of offloading.  Continue with Mepilex Ag and gentian violet to periwound.  Wound not as deep today. There continues to be some periwound callus.  Sharp debridement done today.  No major changes.  However, wound is making slow progress.  No new changes.      Description:Left plantar ulcer  Location: left foot 2nd metatarsal head   Duration:  January 2019   Context: Had cellulitis and in hospital Jan 2016.  Wound last healed September 2018  Modifying Factors: Diabetes and foot deformity as well as noncompliance                  See wound doc progress notes. Documents will be scanned.        Antonio Hidalgo Jr  [facility]

## 2020-10-22 ENCOUNTER — OFFICE VISIT (OUTPATIENT)
Dept: WOUND CARE | Facility: HOSPITAL | Age: 75
End: 2020-10-22
Attending: SURGERY
Payer: MEDICARE

## 2020-10-22 VITALS
RESPIRATION RATE: 20 BRPM | SYSTOLIC BLOOD PRESSURE: 116 MMHG | HEART RATE: 61 BPM | DIASTOLIC BLOOD PRESSURE: 73 MMHG | TEMPERATURE: 97 F

## 2020-10-22 DIAGNOSIS — L97.522 ULCER OF LEFT FOOT, WITH FAT LAYER EXPOSED: ICD-10-CM

## 2020-10-22 DIAGNOSIS — E11.610 CHARCOT'S JOINT ARTHROPATHY IN TYPE 2 DIABETES MELLITUS: Primary | ICD-10-CM

## 2020-10-22 PROCEDURE — 99499 UNLISTED E&M SERVICE: CPT | Mod: ,,, | Performed by: SURGERY

## 2020-10-22 PROCEDURE — 99499 NO LOS: ICD-10-PCS | Mod: ,,, | Performed by: SURGERY

## 2020-10-22 PROCEDURE — 11042 DBRDMT SUBQ TIS 1ST 20SQCM/<: CPT

## 2020-10-22 PROCEDURE — 27201912 HC WOUND CARE DEBRIDEMENT SUPPLIES

## 2020-10-22 NOTE — ASSESSMENT & PLAN NOTE
The wound is about the same.  Even though improves he will probably have a recurrence.  I have recommended he see a ft doctor in [place] to at least discuss possible corrective surgery.

## 2020-10-22 NOTE — PROGRESS NOTES
[facility]  Wound Care  Progress Note    Problem List Items Addressed This Visit     Charcot's joint arthropathy in type 2 diabetes mellitus - Primary    Overview     Patient has long-time foot deformity on the left. He has plantar protrusion of the left 2nd metatarsal head.  This has made him prone to ulceration in this region.  On 02/14/2019, I have recommended to the patient either long-term use of offloading diabetic shoes with inserts or surgery to correct the foot deformity by a podiatrist.  He would like to utilize diabetic shoes.  He has been instructed to obtain orders for diabetic shoes from his primary care physician Dr. Chowdhury.         Current Assessment & Plan     The wound is about the same.  Even though improves he will probably have a recurrence.  I have recommended he see a ft doctor in [place] to at least discuss possible corrective surgery.           Other Visit Diagnoses     Ulcer of left foot, with fat layer exposed              See wound doc progress notes. Documents will be scanned.        Gonzalez Alvarado  [facility]

## 2020-11-05 ENCOUNTER — OFFICE VISIT (OUTPATIENT)
Dept: WOUND CARE | Facility: HOSPITAL | Age: 75
End: 2020-11-05
Attending: SURGERY
Payer: MEDICARE

## 2020-11-05 VITALS
RESPIRATION RATE: 18 BRPM | HEART RATE: 62 BPM | TEMPERATURE: 98 F | DIASTOLIC BLOOD PRESSURE: 76 MMHG | SYSTOLIC BLOOD PRESSURE: 115 MMHG

## 2020-11-05 DIAGNOSIS — L97.422 DIABETIC ULCER OF LEFT MIDFOOT ASSOCIATED WITH TYPE 2 DIABETES MELLITUS, WITH FAT LAYER EXPOSED: ICD-10-CM

## 2020-11-05 DIAGNOSIS — L97.522 ULCER OF LEFT FOOT, WITH FAT LAYER EXPOSED: ICD-10-CM

## 2020-11-05 DIAGNOSIS — E11.610 CHARCOT'S JOINT ARTHROPATHY IN TYPE 2 DIABETES MELLITUS: Primary | ICD-10-CM

## 2020-11-05 DIAGNOSIS — E11.621 DIABETIC ULCER OF LEFT MIDFOOT ASSOCIATED WITH TYPE 2 DIABETES MELLITUS, WITH FAT LAYER EXPOSED: ICD-10-CM

## 2020-11-05 PROCEDURE — 99499 NO LOS: ICD-10-PCS | Mod: ,,, | Performed by: SURGERY

## 2020-11-05 PROCEDURE — 11042 DBRDMT SUBQ TIS 1ST 20SQCM/<: CPT

## 2020-11-05 PROCEDURE — 27201912 HC WOUND CARE DEBRIDEMENT SUPPLIES

## 2020-11-05 PROCEDURE — 99499 UNLISTED E&M SERVICE: CPT | Mod: ,,, | Performed by: SURGERY

## 2020-11-05 NOTE — PROGRESS NOTES
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  Patient previously was discharged from the Wound Clinic in September of 2018.  At that time, he did have inserts made to relieve pressure.  The patient admits he has not been utilizing his inserts at all.  This is been ongoing for several months.  As of 2/14/2019, he has subsequently developed another wound in the same location of the 2nd metatarsal head protrusion. The patient did have open heart surgery and when he was off if his feet this wound actually healed.  However, he is fairly active.  Stressed the importance of offloading.  Continue with Mepilex Ag and gentian violet to periwound.  Wound not as deep today. There continues to be some periwound callus.  Sharp debridement done today.  No major changes.  However, wound is making slow progress.  No new changes.      Description:Left plantar ulcer  Location: left foot 2nd metatarsal head   Duration:  January 2019   Context: Had cellulitis and in hospital Jan 2016.  Wound last healed September 2018  Modifying Factors: Diabetes and foot deformity as well as noncompliance            Current Assessment & Plan     Very small some callus removed with a 15 blade.  Continue present wound care.         Charcot's joint arthropathy in type 2 diabetes mellitus - Primary    Overview     Patient has long-time foot deformity on the left. He has plantar protrusion of the left 2nd metatarsal head.  This has made him prone to ulceration in this region.  On 02/14/2019, I have recommended to the patient either long-term use of offloading diabetic shoes with inserts or  surgery to correct the foot deformity by a podiatrist.  He would like to utilize diabetic shoes.  He has been instructed to obtain orders for diabetic shoes from his primary care physician Dr. Chowdhury.         Current Assessment & Plan     Very small some callus removed with a 15 blade.  Continue present wound care.           Other Visit Diagnoses     Ulcer of left foot, with fat layer exposed              See wound doc progress notes. Documents will be scanned.        Gonzalez BRADLEY Landry [facility]

## 2020-11-19 ENCOUNTER — OFFICE VISIT (OUTPATIENT)
Dept: WOUND CARE | Facility: HOSPITAL | Age: 75
End: 2020-11-19
Attending: SURGERY
Payer: MEDICARE

## 2020-11-19 VITALS
TEMPERATURE: 98 F | SYSTOLIC BLOOD PRESSURE: 110 MMHG | HEART RATE: 63 BPM | RESPIRATION RATE: 18 BRPM | DIASTOLIC BLOOD PRESSURE: 70 MMHG

## 2020-11-19 DIAGNOSIS — L97.422 DIABETIC ULCER OF LEFT MIDFOOT ASSOCIATED WITH TYPE 2 DIABETES MELLITUS, WITH FAT LAYER EXPOSED: ICD-10-CM

## 2020-11-19 DIAGNOSIS — E11.621 DIABETIC ULCER OF LEFT MIDFOOT ASSOCIATED WITH TYPE 2 DIABETES MELLITUS, WITH FAT LAYER EXPOSED: ICD-10-CM

## 2020-11-19 PROCEDURE — 99499 UNLISTED E&M SERVICE: CPT | Mod: ,,, | Performed by: SURGERY

## 2020-11-19 PROCEDURE — 99499 NO LOS: ICD-10-PCS | Mod: ,,, | Performed by: SURGERY

## 2020-11-19 PROCEDURE — 11042 DBRDMT SUBQ TIS 1ST 20SQCM/<: CPT

## 2020-11-19 PROCEDURE — 27201912 HC WOUND CARE DEBRIDEMENT SUPPLIES

## 2020-11-19 NOTE — PROGRESS NOTES
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  Patient previously was discharged from the Wound Clinic in September of 2018.  At that time, he did have inserts made to relieve pressure.  The patient admits he has not been utilizing his inserts at all.  This is been ongoing for several months.  As of 2/14/2019, he has subsequently developed another wound in the same location of the 2nd metatarsal head protrusion. The patient did have open heart surgery and when he was off if his feet this wound actually healed.  However, he is fairly active.  Stressed the importance of offloading.  Continue with Mepilex Ag and gentian violet to periwound.  Wound not as deep today. There continues to be some periwound callus.  Sharp debridement done today.  No major changes.  However, wound is making slow progress.  No new changes      Description:Left plantar ulcer  Location: left foot 2nd metatarsal head   Duration:  January 2019   Context: Had cellulitis and in hospital Jan 2016.  Wound last healed September 2018  Modifying Factors: Diabetes and foot deformity as well as noncompliance                  See wound doc progress notes. Documents will be scanned.        Antonio Hidalgo Jr  [facility]

## 2020-11-25 NOTE — PROGRESS NOTES
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  Patient previously was discharged from the Wound Clinic in September of 2018.  At that time, he did have inserts made to relieve pressure.  The patient admits he has not been utilizing his inserts at all.  This is been ongoing for several months.  As of 2/14/2019, he has subsequently developed another wound in the same location of the 2nd metatarsal head protrusion. The patient did have open heart surgery and when he was off if his feet this wound actually healed.  However, he is fairly active.  Stressed the importance of offloading.  Continue silver alginate.      Description:Left plantar ulcer  Location: left foot 2nd metatarsal head   Duration:  January 2019   Context: Had cellulitis and in hospital Jan 2016.  Wound last healed September 2018  Modifying Factors: Diabetes and foot deformity as well as noncompliance.            Current Assessment & Plan     Wound is about the same.  Callus was debrided and is about the same depth.               See wound doc progress notes. Documents will be scanned.        Gonzalez Alvarado  [facility]  
See wound care assessment documentation in chart review. Scanned under media tab.     
[phone]

## 2020-12-24 ENCOUNTER — OFFICE VISIT (OUTPATIENT)
Dept: WOUND CARE | Facility: HOSPITAL | Age: 75
End: 2020-12-24
Attending: SURGERY
Payer: MEDICARE

## 2020-12-24 VITALS
SYSTOLIC BLOOD PRESSURE: 161 MMHG | DIASTOLIC BLOOD PRESSURE: 95 MMHG | RESPIRATION RATE: 20 BRPM | TEMPERATURE: 98 F | HEART RATE: 60 BPM

## 2020-12-24 DIAGNOSIS — E11.621 DIABETIC ULCER OF LEFT MIDFOOT ASSOCIATED WITH TYPE 2 DIABETES MELLITUS, WITH FAT LAYER EXPOSED: ICD-10-CM

## 2020-12-24 DIAGNOSIS — L97.422 DIABETIC ULCER OF LEFT MIDFOOT ASSOCIATED WITH TYPE 2 DIABETES MELLITUS, WITH FAT LAYER EXPOSED: ICD-10-CM

## 2020-12-24 PROCEDURE — 99499 NO LOS: ICD-10-PCS | Mod: ,,, | Performed by: SURGERY

## 2020-12-24 PROCEDURE — 99499 UNLISTED E&M SERVICE: CPT | Mod: ,,, | Performed by: SURGERY

## 2020-12-24 PROCEDURE — 27201912 HC WOUND CARE DEBRIDEMENT SUPPLIES

## 2020-12-24 PROCEDURE — 11042 DBRDMT SUBQ TIS 1ST 20SQCM/<: CPT

## 2020-12-24 NOTE — PROGRESS NOTES
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  Patient previously was discharged from the Wound Clinic in September of 2018.  At that time, he did have inserts made to relieve pressure.  The patient admits he has not been utilizing his inserts at all.  This is been ongoing for several months.  As of 2/14/2019, he has subsequently developed another wound in the same location of the 2nd metatarsal head protrusion. The patient did have open heart surgery and when he was off if his feet this wound actually healed.  However, he is fairly active.  Stressed the importance of offloading.  Continue with collagen and gentian violet to periwound.  Wound not as deep today. There continues to be some periwound callus.  Sharp debridement done today.  No major changes.  However, wound is making slow progress.  No new changes      Description:Left plantar ulcer  Location: left foot 2nd metatarsal head   Duration:  January 2019   Context: Had cellulitis and in hospital Jan 2016.  Wound last healed September 2018  Modifying Factors: Diabetes and foot deformity as well as noncompliance                  See wound doc progress notes. Documents will be scanned.        Antonio Hidalgo Jr  [facility]

## 2021-01-07 ENCOUNTER — OFFICE VISIT (OUTPATIENT)
Dept: WOUND CARE | Facility: HOSPITAL | Age: 76
End: 2021-01-07
Attending: SURGERY
Payer: MEDICARE

## 2021-01-07 VITALS
HEART RATE: 68 BPM | DIASTOLIC BLOOD PRESSURE: 74 MMHG | TEMPERATURE: 97 F | RESPIRATION RATE: 20 BRPM | SYSTOLIC BLOOD PRESSURE: 120 MMHG

## 2021-01-07 DIAGNOSIS — E11.621 DIABETIC ULCER OF LEFT MIDFOOT ASSOCIATED WITH TYPE 2 DIABETES MELLITUS, WITH FAT LAYER EXPOSED: ICD-10-CM

## 2021-01-07 DIAGNOSIS — L97.422 DIABETIC ULCER OF LEFT MIDFOOT ASSOCIATED WITH TYPE 2 DIABETES MELLITUS, WITH FAT LAYER EXPOSED: ICD-10-CM

## 2021-01-07 PROCEDURE — 99499 UNLISTED E&M SERVICE: CPT | Mod: ,,, | Performed by: SURGERY

## 2021-01-07 PROCEDURE — 99213 OFFICE O/P EST LOW 20 MIN: CPT

## 2021-01-07 PROCEDURE — 99499 NO LOS: ICD-10-PCS | Mod: ,,, | Performed by: SURGERY

## 2021-01-21 ENCOUNTER — OFFICE VISIT (OUTPATIENT)
Dept: WOUND CARE | Facility: HOSPITAL | Age: 76
End: 2021-01-21
Attending: SURGERY
Payer: MEDICARE

## 2021-01-21 VITALS
SYSTOLIC BLOOD PRESSURE: 114 MMHG | HEART RATE: 66 BPM | DIASTOLIC BLOOD PRESSURE: 72 MMHG | TEMPERATURE: 97 F | RESPIRATION RATE: 20 BRPM

## 2021-01-21 DIAGNOSIS — E11.610 CHARCOT'S JOINT ARTHROPATHY IN TYPE 2 DIABETES MELLITUS: Primary | ICD-10-CM

## 2021-01-21 DIAGNOSIS — L97.522: ICD-10-CM

## 2021-01-21 PROCEDURE — 99499 NO LOS: ICD-10-PCS | Mod: ,,, | Performed by: SURGERY

## 2021-01-21 PROCEDURE — 99212 OFFICE O/P EST SF 10 MIN: CPT

## 2021-01-21 PROCEDURE — 99499 UNLISTED E&M SERVICE: CPT | Mod: ,,, | Performed by: SURGERY

## 2022-07-18 PROBLEM — I48.0 PAF (PAROXYSMAL ATRIAL FIBRILLATION): Status: ACTIVE | Noted: 2022-07-18
